# Patient Record
Sex: FEMALE | Race: WHITE | NOT HISPANIC OR LATINO | Employment: OTHER | URBAN - METROPOLITAN AREA
[De-identification: names, ages, dates, MRNs, and addresses within clinical notes are randomized per-mention and may not be internally consistent; named-entity substitution may affect disease eponyms.]

---

## 2023-06-26 PROBLEM — K92.2 ACUTE UPPER GI BLEEDING: Status: ACTIVE | Noted: 2023-06-26

## 2023-06-26 PROBLEM — F10.10 ALCOHOL ABUSE: Status: ACTIVE | Noted: 2023-06-26

## 2023-06-28 DIAGNOSIS — K62.89 RECTAL MASS: Primary | ICD-10-CM

## 2023-06-28 PROBLEM — D49.0: Status: ACTIVE | Noted: 2023-06-28

## 2023-06-28 PROBLEM — K92.2 ACUTE UPPER GI BLEEDING: Status: RESOLVED | Noted: 2023-06-26 | Resolved: 2023-06-28

## 2023-07-11 ENCOUNTER — TELEPHONE (OUTPATIENT)
Dept: SURGERY | Facility: CLINIC | Age: 43
End: 2023-07-11
Payer: MEDICAID

## 2023-07-11 NOTE — TELEPHONE ENCOUNTER
"Spoke with pt regarding a sooner appt with [facility]. Informed pt that there are no sooner appts available but appt has been placed on the wait list. Pt states, "she had a colonoscopy recently and was told she more than likely has stage 1 cancer but no further details or results from pathology." Pt was told by Cristhian Lanza NP that she would be scheduled with a provider. Provider not within [facility], but advised that this may have been a provider within [facility] regarding her upper GI bleed. Number provided to [facility] to contact Dr. Bateman's office regarding more information from colonoscopy. Pt verbalized understanding. Denies further questions.       ----- Message from Sadie Leach sent at 7/11/2023  4:33 PM CDT -----  Regarding: Patient Advice  Contact: Pt [phone]  Pt is calling to speak to staff please call       "

## 2023-07-14 ENCOUNTER — TELEPHONE (OUTPATIENT)
Dept: GASTROENTEROLOGY | Facility: CLINIC | Age: 43
End: 2023-07-14
Payer: MEDICAID

## 2023-07-14 NOTE — TELEPHONE ENCOUNTER
Spoke with patient about test results. Verbal understanding.       ----- Message from Joey Bateman MD sent at 7/7/2023 10:16 AM CDT -----  P path reviewed.  Biopsy of stomach shows minor inflammation.  Rectal lesion shows precancerous given size likely more ominous tissue beneath sampled area.  Still needs to be removed.  Maintain appointment with colorectal surgery on the 21st

## 2023-07-19 ENCOUNTER — TELEPHONE (OUTPATIENT)
Dept: SURGERY | Facility: CLINIC | Age: 43
End: 2023-07-19
Payer: MEDICAID

## 2023-07-20 NOTE — PROGRESS NOTES
[facility] Office Visit History and Physical    Referring Md:   Joey Bateman Md  [address]  [address]  [place] [address]    SUBJECTIVE:     Chief Complaint:     History of Present Illness:  The patient is a new patient to this practice.   Course is as follows:  Shruthi Churchill is a 43 y.o. female presents with rectal polyp.  Has noticed a prolapsing lesion from her rectum requiring ED visits for reduction.  Had a colonoscopy with Dr. Bateman on 6/28 which showed a large rectal polyp concerning for malignancy. Biopsies showed tubular adenoma. Patient also with painful hemorrhoids that she attributes to history of weight lifting/training.  No family history of colon cancer or inflammatory bowel disease.     Last Colonoscopy: 6/28/23  Findings:        Hemorrhoids were found on perianal exam.        The terminal ileum appeared normal.        A diffuse area of mild melanosis was found in the entire colon.        A frond-like/villous non-obstructing large mass was found in the        rectum. The mass was partially circumferential (involving one-third        of the lumen circumference). The mass measured five cm in length. No        bleeding was present. This was biopsied with a cold forceps for        histology. Verification of patient identification for the specimen        was done. Estimated blood loss was minimal.   Impression:            - Preparation of the colon was fair.                          - Hemorrhoids found on perianal exam.                          - The examined portion of the ileum was normal.                          - Melanosis in the colon.                          - Likely malignant tumor in the rectum. Biopsied.     Review of patient's allergies indicates:   Allergen Reactions    Shellfish containing products Swelling     Facial swelling       Past Medical History:   Diagnosis Date    Gilbert's syndrome     Hypertension      Past Surgical History:   Procedure Laterality Date     "COLONOSCOPY N/A 6/28/2023    Procedure: COLONOSCOPY;  Surgeon: Joey Bateman MD;  Location: [facility];  Service: Endoscopy;  Laterality: N/A;    ESOPHAGOGASTRODUODENOSCOPY N/A 6/28/2023    Procedure: EGD (ESOPHAGOGASTRODUODENOSCOPY);  Surgeon: Joey Bateman MD;  Location: [facility];  Service: Endoscopy;  Laterality: N/A;     No family history on file.  Social History     Tobacco Use    Smoking status: Never    Smokeless tobacco: Never   Substance Use Topics    Alcohol use: Yes     Comment: 2 bottles of wine daily    Drug use: Yes     Comment: mushrooms rarely        Review of Systems:  Review of Systems   All other systems reviewed and are negative.    OBJECTIVE:     Vital Signs (Most Recent)  /82 (BP Location: Left arm, Patient Position: Sitting)   Pulse 70   Resp 19   Ht 5' 4" (1.626 m)   Wt 59.2 kg (130 lb 8.2 oz)   LMP 06/18/2023   SpO2 98%   BMI 22.40 kg/m²     Physical Exam:  General: 43 y.o. female , anxious   Neuro: alert and oriented x 4.  Moves all extremities.     HEENT: normocephalic, atraumatic, PERRL, EOMI   Respiratory: respirations are even and unlabored  Cardiac: regular rate and rhythm  Abdomen: soft, NTND  Extremities: Warm dry and intact  Skin: no rashes  Anorectal: + external hemorrhoids with inflammation, no thrombosis    See provation report for flexible sigmoidoscopy    Labs:   : 1.  Stomach, random biopsies:   Gastric mucosa with mild chronic gastritis.   No evidence of Helicobacter species on H&E stain.     2. Rectum, lesion, biopsy:   Tubular adenoma, fragmented.        Imaging:   Impression:     1. Findings suggesting hepatic steatosis, correlation with LFTs recommended.  2. No findings to suggest obstructive uropathy noting there is contrast within the renal collecting systems on the pre contrast images limiting evaluation for nephrolithiasis.  3. Moderate to large amount of stool throughout the colon, could reflect developing constipation.  4. Please " see above for several additional findings.        Electronically signed by: Caden Nieto MD  Date:                                            06/26/2023  Time:                                           12:11      ASSESSMENT/PLAN:     Diagnoses and all orders for this visit:    Tumor of rectum  -     Cancel: MRI Rectal Cancer Without Contrast; Future  -     MRI Rectal Cancer Without Contrast; Future  -     Specimen to Pathology Gastrointestinal tract    Other orders  -     hydrocortisone (ANUSOL-HC) 2.5 % rectal cream; Place rectally 2 (two) times daily.  -     diazePAM (VALIUM) 5 MG tablet; Take 1 tablet (5 mg total) by mouth every 6 (six) hours as needed for Anxiety (take 1 tab 30 min prior to MRI).  -     Flexible Sigmoidoscopy        43 y.o. female with large rectal polyp     - Labs, imaging, and endoscopy reviewed.   - Patient with large polyp in distal rectum.  Initial pathology consistent with TA. Flexible sigmoidoscopy in office with repeat biopsies taken.   - Due to size and location, will plan for MR to assess for underlying invasive component given size and risk of sampling error   - anusol and recticare for hemorrhoids, laxative as needed for constipation   - follow up after MR complete.      Barbara Zaldivar MD  Staff Surgeon  Colon & Rectal Surgery

## 2023-07-21 ENCOUNTER — OFFICE VISIT (OUTPATIENT)
Dept: SURGERY | Facility: CLINIC | Age: 43
End: 2023-07-21
Payer: MEDICAID

## 2023-07-21 ENCOUNTER — TELEPHONE (OUTPATIENT)
Dept: SURGERY | Facility: CLINIC | Age: 43
End: 2023-07-21
Payer: MEDICAID

## 2023-07-21 VITALS
OXYGEN SATURATION: 98 % | SYSTOLIC BLOOD PRESSURE: 125 MMHG | DIASTOLIC BLOOD PRESSURE: 82 MMHG | HEART RATE: 70 BPM | BODY MASS INDEX: 22.28 KG/M2 | HEIGHT: 64 IN | RESPIRATION RATE: 19 BRPM | WEIGHT: 130.5 LBS

## 2023-07-21 DIAGNOSIS — K62.89 RECTAL MASS: ICD-10-CM

## 2023-07-21 DIAGNOSIS — D49.0: Primary | ICD-10-CM

## 2023-07-21 PROCEDURE — 99204 OFFICE O/P NEW MOD 45 MIN: CPT | Mod: S$PBB,,, | Performed by: SURGERY

## 2023-07-21 PROCEDURE — 1159F MED LIST DOCD IN RCRD: CPT | Mod: CPTII,,, | Performed by: SURGERY

## 2023-07-21 PROCEDURE — 99204 PR OFFICE/OUTPT VISIT, NEW, LEVL IV, 45-59 MIN: ICD-10-PCS | Mod: S$PBB,,, | Performed by: SURGERY

## 2023-07-21 PROCEDURE — 3074F SYST BP LT 130 MM HG: CPT | Mod: CPTII,,, | Performed by: SURGERY

## 2023-07-21 PROCEDURE — 88307 TISSUE EXAM BY PATHOLOGIST: CPT | Mod: 26,,, | Performed by: PATHOLOGY

## 2023-07-21 PROCEDURE — 3074F PR MOST RECENT SYSTOLIC BLOOD PRESSURE < 130 MM HG: ICD-10-PCS | Mod: CPTII,,, | Performed by: SURGERY

## 2023-07-21 PROCEDURE — 88307 PR  SURG PATH,LEVEL V: ICD-10-PCS | Mod: 26,,, | Performed by: PATHOLOGY

## 2023-07-21 PROCEDURE — 99999 PR PBB SHADOW E&M-EST. PATIENT-LVL III: ICD-10-PCS | Mod: PBBFAC,,, | Performed by: SURGERY

## 2023-07-21 PROCEDURE — 3008F PR BODY MASS INDEX (BMI) DOCUMENTED: ICD-10-PCS | Mod: CPTII,,, | Performed by: SURGERY

## 2023-07-21 PROCEDURE — 99213 OFFICE O/P EST LOW 20 MIN: CPT | Mod: PBBFAC,25 | Performed by: SURGERY

## 2023-07-21 PROCEDURE — 3079F DIAST BP 80-89 MM HG: CPT | Mod: CPTII,,, | Performed by: SURGERY

## 2023-07-21 PROCEDURE — 3079F PR MOST RECENT DIASTOLIC BLOOD PRESSURE 80-89 MM HG: ICD-10-PCS | Mod: CPTII,,, | Performed by: SURGERY

## 2023-07-21 PROCEDURE — 1159F PR MEDICATION LIST DOCUMENTED IN MEDICAL RECORD: ICD-10-PCS | Mod: CPTII,,, | Performed by: SURGERY

## 2023-07-21 PROCEDURE — 88307 TISSUE EXAM BY PATHOLOGIST: CPT | Performed by: PATHOLOGY

## 2023-07-21 PROCEDURE — 99999 PR PBB SHADOW E&M-EST. PATIENT-LVL III: CPT | Mod: PBBFAC,,, | Performed by: SURGERY

## 2023-07-21 PROCEDURE — 3008F BODY MASS INDEX DOCD: CPT | Mod: CPTII,,, | Performed by: SURGERY

## 2023-07-21 RX ORDER — DIAZEPAM 5 MG/1
5 TABLET ORAL EVERY 6 HOURS PRN
Qty: 2 TABLET | Refills: 0 | Status: SHIPPED | OUTPATIENT
Start: 2023-07-21 | End: 2023-08-15 | Stop reason: SDUPTHER

## 2023-07-21 RX ORDER — HYDROCORTISONE 25 MG/G
CREAM TOPICAL 2 TIMES DAILY
Qty: 28 G | Refills: 3 | Status: ON HOLD | OUTPATIENT
Start: 2023-07-21 | End: 2023-10-11 | Stop reason: HOSPADM

## 2023-07-21 NOTE — TELEPHONE ENCOUNTER
"Spoke with pt regarding appt for 1:40 PM with Dr. Zaldivar/. Pt states, "she is running late but should arrive shortly." Directions provided to clinic location. Requested pt call office for more questions or concerns.       ----- Message from Aliza Gomez sent at 7/21/2023  1:10 PM CDT -----  Regarding: running late  Contact: [phone]  Shruthi Churchill calling regarding running late to appt but should be there within the 15 min brad period. Please call    "

## 2023-07-21 NOTE — PROVATION PATIENT INSTRUCTIONS
Discharge Summary/Instructions after an Endoscopic Procedure  Patient Name: Shruthi Churchill  Patient MRN: [id number]  Patient YOB: 1980  Friday, July 21, 2023  Barbara Zaldivar MD  Dear patient,  As a result of recent federal legislation (The Federal Cures Act), you may   receive lab or pathology results from your procedure in your [facility]   account before your physician is able to contact you. Your physician or   their representative will relay the results to you with their   recommendations at their soonest availability.  Thank you,  RESTRICTIONS:  During your procedure today, you received medications for sedation.  These   medications may affect your judgment, balance and coordination.  Therefore,   for 24 hours, you have the following restrictions:   - DO NOT drive a car, operate machinery, make legal/financial decisions,   sign important papers or drink alcohol.    ACTIVITY:  Today: no heavy lifting, straining or running due to procedural   sedation/anesthesia.  The following day: return to full activity including work.  DIET:  Eat and drink normally unless instructed otherwise.     TREATMENT FOR COMMON SIDE EFFECTS:  - Mild abdominal pain, nausea, belching, bloating or excessive gas:  rest,   eat lightly and use a heating pad.  - Sore Throat: treat with throat lozenges and/or gargle with warm salt   water.  - Because air was used during the procedure, expelling large amounts of air   from your rectum or belching is normal.  - If a bowel prep was taken, you may not have a bowel movement for 1-3 days.    This is normal.  SYMPTOMS TO WATCH FOR AND REPORT TO YOUR PHYSICIAN:  1. Abdominal pain or bloating, other than gas cramps.  2. Chest pain.  3. Back pain.  4. Signs of infection such as: chills or fever occurring within 24 hours   after the procedure.  5. Rectal bleeding, which would show as bright red, maroon, or black stools.   (A tablespoon of blood from the rectum is not serious, especially if    hemorrhoids are present.)  6. Vomiting.  7. Weakness or dizziness.  GO DIRECTLY TO THE NEAREST EMERGENCY ROOM IF YOU HAVE ANY OF THE FOLLOWING:      Difficulty breathing              Chills and/or fever over 101 F   Persistent vomiting and/or vomiting blood   Severe abdominal pain   Severe chest pain   Black, tarry stools   Bleeding- more than one tablespoon   Any other symptom or condition that you feel may need urgent attention  Your doctor recommends these additional instructions:  If any biopsies were taken, your doctors clinic will contact you in 1 to 2   weeks with any results.  - Discharge patient to home (ambulatory).  For questions, problems or results please call your physician - Barbara Zaldivar MD at Work:  [phone].  [facility], EMERGENCY ROOM PHONE NUMBER: [phone]  IF A COMPLICATION OR EMERGENCY SITUATION ARISES AND YOU ARE UNABLE TO REACH   YOUR PHYSICIAN - GO DIRECTLY TO THE EMERGENCY ROOM.  MD Barbara Tarango MD  7/21/2023 3:31:45 PM  This report has been verified and signed electronically.  Dear patient,  As a result of recent federal legislation (The Federal Cures Act), you may   receive lab or pathology results from your procedure in your [facility]   account before your physician is able to contact you. Your physician or   their representative will relay the results to you with their   recommendations at their soonest availability.  Thank you,  PROVATION

## 2023-07-28 LAB
COMMENT: NORMAL
FINAL PATHOLOGIC DIAGNOSIS: NORMAL
GROSS: NORMAL
Lab: NORMAL

## 2023-08-15 ENCOUNTER — TELEPHONE (OUTPATIENT)
Dept: SURGERY | Facility: CLINIC | Age: 43
End: 2023-08-15
Payer: MEDICAID

## 2023-08-15 ENCOUNTER — HOSPITAL ENCOUNTER (OUTPATIENT)
Dept: RADIOLOGY | Facility: HOSPITAL | Age: 43
Discharge: HOME | End: 2023-08-15
Attending: SURGERY
Payer: MEDICAID

## 2023-08-15 DIAGNOSIS — D49.0: ICD-10-CM

## 2023-08-15 PROCEDURE — 72195 MRI RECTAL CANCER WITHOUT CONTRAST: ICD-10-PCS | Mod: 26,,, | Performed by: STUDENT IN AN ORGANIZED HEALTH CARE EDUCATION/TRAINING PROGRAM

## 2023-08-15 PROCEDURE — 72195 MRI PELVIS W/O DYE: CPT | Mod: 26,,, | Performed by: STUDENT IN AN ORGANIZED HEALTH CARE EDUCATION/TRAINING PROGRAM

## 2023-08-15 PROCEDURE — 72195 MRI PELVIS W/O DYE: CPT | Mod: TC

## 2023-08-15 RX ORDER — DIAZEPAM 5 MG/1
5 TABLET ORAL EVERY 6 HOURS PRN
Qty: 2 TABLET | Refills: 0 | Status: SHIPPED | OUTPATIENT
Start: 2023-08-15 | End: 2023-08-23 | Stop reason: CLARIF

## 2023-08-15 NOTE — TELEPHONE ENCOUNTER
"Spoke with pharmacy in regards to if the pt picked up prescribed Valium. Pharmacy confirms it was picked up on 7/21. Spoke with pt to discuss medication request. Pt states, "she did not  and she does not have it with her medications, only something for her stomach. Sometimes her neighbor picks up and will need to discuss with them if they have it." Requesting something to take for MRI tonight at 7 PM for anxiety. Informed pt that request will be sent to be filled but not not be completed in time for imaging tonight at 7 PM. Pt requesting to have sent to [facility], [address] [phone]. Informed that script may need to be sent to [facility] to be filled faster. Pt denies further questions.       ----- Message from Eliel Bennett sent at 8/15/2023  1:07 PM CDT -----  Regarding: MRI  Contact: pt.[phone]  Pt is calling asking for something to relax her for the scheduled MRI 8/15 @7 pm. She is asking for Valum.Patient Requesting Call Back @ [phone]      [id number] - [place], [place] - [address]  [address]  [place] [address]  Phone: [phone] Fax: [phone]        "

## 2023-08-15 NOTE — TELEPHONE ENCOUNTER
Spoke with pt regarding Valium for MRI tonight. Informed that 2 5 mg of Valium was sent to [facility] pharmacy. Explained that once she picks up, she will not be able to get more if lost or misplaced. Pt verbalized understanding. Denies questions.       ----- Message from Marilee Cobb NP sent at 8/15/2023  2:50 PM CDT -----  Regarding: RE: Medication request  Looks like anna just sent more. Thanks!  ----- Message -----  From: Susi Jean RN  Sent: 8/15/2023   2:15 PM CDT  To: Anna Matthew NP; Marilee Cobb NP; #  Subject: RE: Medication request                           Hey,   I know. I explained that to her and that we may not be able to send in more. I can contact her and let her know if not sending more.      ----- Message -----  From: Marilee Cobb NP  Sent: 8/15/2023   2:11 PM CDT  To: Anna Matthew NP; #  Subject: RE: Medication request                           Kayley ordered her 2 for this MRI on 7/21. Thanks.   ----- Message -----  From: Susi Jean RN  Sent: 8/15/2023   1:49 PM CDT  To: Anna Matthew NP; Marilee Cobb NP; #  Subject: Medication request                               Please read last call note with pt.   She is requesting Valium for MRI tonight at 7 PM.     Susi Mcwilliams

## 2023-08-16 ENCOUNTER — TELEPHONE (OUTPATIENT)
Dept: SURGERY | Facility: CLINIC | Age: 43
End: 2023-08-16
Payer: MEDICAID

## 2023-08-16 ENCOUNTER — PATIENT MESSAGE (OUTPATIENT)
Dept: SURGERY | Facility: CLINIC | Age: 43
End: 2023-08-16
Payer: MEDICAID

## 2023-08-16 DIAGNOSIS — D49.0: Primary | ICD-10-CM

## 2023-08-16 RX ORDER — METRONIDAZOLE 500 MG/1
TABLET ORAL
Qty: 2 TABLET | Refills: 0 | Status: ON HOLD | OUTPATIENT
Start: 2023-08-16 | End: 2023-08-29 | Stop reason: HOSPADM

## 2023-08-16 RX ORDER — CIPROFLOXACIN 500 MG/1
TABLET ORAL
Qty: 2 TABLET | Refills: 0 | Status: ON HOLD | OUTPATIENT
Start: 2023-08-16 | End: 2023-08-29 | Stop reason: HOSPADM

## 2023-08-16 NOTE — TELEPHONE ENCOUNTER
Called Ms. Churchill to discuss biopsy and MRI results.  Will proceed with transanal resection.  Plan for 8/29.  Full bowel prep. Will confirm date via portal.  Pre-op appt on 8/28

## 2023-08-17 ENCOUNTER — TELEPHONE (OUTPATIENT)
Dept: SURGERY | Facility: CLINIC | Age: 43
End: 2023-08-17
Payer: MEDICAID

## 2023-08-17 ENCOUNTER — PATIENT MESSAGE (OUTPATIENT)
Dept: SURGERY | Facility: CLINIC | Age: 43
End: 2023-08-17
Payer: MEDICAID

## 2023-08-17 NOTE — TELEPHONE ENCOUNTER
Attempted to contact pt regarding Pre-op instructions and appt on 8/28. No answer. Left voicemail requesting call back. [facility] number provided.       ----- Message from Jacqui Yadav RN sent at 8/16/2023  3:05 PM CDT -----  Thank you Dr. Zaldivar!  I will make her pre-op appt now.  ----- Message -----  From: Barbara Zaldivar MD  Sent: 8/16/2023   1:47 PM CDT  To: [facility] Jim[facility] Staff    Ms. Churchill is going to be a robotic transanal resection on 8/29 at [facility].  Will need full bowel prep.  Antibiotics sent.  Can we make her a pre-op appt on 8/28?     Susi - can you call her tomorrow and go over pre-op details?

## 2023-08-21 RX ORDER — MUPIROCIN 20 MG/G
OINTMENT TOPICAL
Status: CANCELLED | OUTPATIENT
Start: 2023-08-21

## 2023-08-23 ENCOUNTER — HOSPITAL ENCOUNTER (OUTPATIENT)
Dept: PREADMISSION TESTING | Facility: OTHER | Age: 43
Discharge: HOME | End: 2023-08-23
Attending: SURGERY
Payer: MEDICAID

## 2023-08-23 ENCOUNTER — ANESTHESIA EVENT (OUTPATIENT)
Dept: SURGERY | Facility: OTHER | Age: 43
End: 2023-08-23
Payer: MEDICAID

## 2023-08-23 VITALS
OXYGEN SATURATION: 96 % | WEIGHT: 130 LBS | SYSTOLIC BLOOD PRESSURE: 118 MMHG | TEMPERATURE: 98 F | HEIGHT: 64 IN | BODY MASS INDEX: 22.2 KG/M2 | HEART RATE: 78 BPM | DIASTOLIC BLOOD PRESSURE: 83 MMHG

## 2023-08-23 DIAGNOSIS — Z01.818 PREOP TESTING: Primary | ICD-10-CM

## 2023-08-23 LAB
ABO + RH BLD: NORMAL
ALBUMIN SERPL BCP-MCNC: 4.6 G/DL (ref 3.5–5.2)
ALP SERPL-CCNC: 59 U/L (ref 55–135)
ALT SERPL W/O P-5'-P-CCNC: 18 U/L (ref 10–44)
ANION GAP SERPL CALC-SCNC: 10 MMOL/L (ref 8–16)
ANION GAP SERPL CALC-SCNC: 10 MMOL/L (ref 8–16)
AST SERPL-CCNC: 32 U/L (ref 10–40)
BASOPHILS # BLD AUTO: 0.03 K/UL (ref 0–0.2)
BASOPHILS NFR BLD: 1.2 % (ref 0–1.9)
BILIRUB SERPL-MCNC: 1.5 MG/DL (ref 0.1–1)
BLD GP AB SCN CELLS X3 SERPL QL: NORMAL
BUN SERPL-MCNC: 13 MG/DL (ref 6–20)
BUN SERPL-MCNC: 13 MG/DL (ref 6–20)
CALCIUM SERPL-MCNC: 9.9 MG/DL (ref 8.7–10.5)
CALCIUM SERPL-MCNC: 9.9 MG/DL (ref 8.7–10.5)
CHLORIDE SERPL-SCNC: 103 MMOL/L (ref 95–110)
CHLORIDE SERPL-SCNC: 103 MMOL/L (ref 95–110)
CO2 SERPL-SCNC: 26 MMOL/L (ref 23–29)
CO2 SERPL-SCNC: 26 MMOL/L (ref 23–29)
CREAT SERPL-MCNC: 0.8 MG/DL (ref 0.5–1.4)
CREAT SERPL-MCNC: 0.8 MG/DL (ref 0.5–1.4)
DIFFERENTIAL METHOD: ABNORMAL
EOSINOPHIL # BLD AUTO: 0 K/UL (ref 0–0.5)
EOSINOPHIL NFR BLD: 1.2 % (ref 0–8)
ERYTHROCYTE [DISTWIDTH] IN BLOOD BY AUTOMATED COUNT: 14.6 % (ref 11.5–14.5)
EST. GFR  (NO RACE VARIABLE): >60 ML/MIN/1.73 M^2
EST. GFR  (NO RACE VARIABLE): >60 ML/MIN/1.73 M^2
GLUCOSE SERPL-MCNC: 84 MG/DL (ref 70–110)
GLUCOSE SERPL-MCNC: 84 MG/DL (ref 70–110)
HCT VFR BLD AUTO: 39.2 % (ref 37–48.5)
HGB BLD-MCNC: 12.2 G/DL (ref 12–16)
IMM GRANULOCYTES # BLD AUTO: 0 K/UL (ref 0–0.04)
IMM GRANULOCYTES NFR BLD AUTO: 0 % (ref 0–0.5)
LYMPHOCYTES # BLD AUTO: 0.9 K/UL (ref 1–4.8)
LYMPHOCYTES NFR BLD: 35.2 % (ref 18–48)
MCH RBC QN AUTO: 27.9 PG (ref 27–31)
MCHC RBC AUTO-ENTMCNC: 31.1 G/DL (ref 32–36)
MCV RBC AUTO: 90 FL (ref 82–98)
MONOCYTES # BLD AUTO: 0.3 K/UL (ref 0.3–1)
MONOCYTES NFR BLD: 13.1 % (ref 4–15)
NEUTROPHILS # BLD AUTO: 1.2 K/UL (ref 1.8–7.7)
NEUTROPHILS NFR BLD: 49.3 % (ref 38–73)
NRBC BLD-RTO: 0 /100 WBC
PLATELET # BLD AUTO: 280 K/UL (ref 150–450)
PMV BLD AUTO: 9.2 FL (ref 9.2–12.9)
POTASSIUM SERPL-SCNC: 4.3 MMOL/L (ref 3.5–5.1)
POTASSIUM SERPL-SCNC: 4.3 MMOL/L (ref 3.5–5.1)
PROT SERPL-MCNC: 7.1 G/DL (ref 6–8.4)
RBC # BLD AUTO: 4.38 M/UL (ref 4–5.4)
SODIUM SERPL-SCNC: 139 MMOL/L (ref 136–145)
SODIUM SERPL-SCNC: 139 MMOL/L (ref 136–145)
SPECIMEN OUTDATE: NORMAL
WBC # BLD AUTO: 2.44 K/UL (ref 3.9–12.7)

## 2023-08-23 PROCEDURE — 93010 EKG 12-LEAD: ICD-10-PCS | Mod: ,,, | Performed by: INTERNAL MEDICINE

## 2023-08-23 PROCEDURE — 80053 COMPREHEN METABOLIC PANEL: CPT | Performed by: ANESTHESIOLOGY

## 2023-08-23 PROCEDURE — 36415 COLL VENOUS BLD VENIPUNCTURE: CPT | Performed by: ANESTHESIOLOGY

## 2023-08-23 PROCEDURE — 86900 BLOOD TYPING SEROLOGIC ABO: CPT | Performed by: ANESTHESIOLOGY

## 2023-08-23 PROCEDURE — 85025 COMPLETE CBC W/AUTO DIFF WBC: CPT | Performed by: ANESTHESIOLOGY

## 2023-08-23 PROCEDURE — 93005 ELECTROCARDIOGRAM TRACING: CPT

## 2023-08-23 PROCEDURE — 93010 ELECTROCARDIOGRAM REPORT: CPT | Mod: ,,, | Performed by: INTERNAL MEDICINE

## 2023-08-23 RX ORDER — LIDOCAINE HYDROCHLORIDE 10 MG/ML
0.5 INJECTION, SOLUTION EPIDURAL; INFILTRATION; INTRACAUDAL; PERINEURAL ONCE
Status: CANCELLED | OUTPATIENT
Start: 2023-08-23 | End: 2023-08-23

## 2023-08-23 RX ORDER — SODIUM CHLORIDE, SODIUM LACTATE, POTASSIUM CHLORIDE, CALCIUM CHLORIDE 600; 310; 30; 20 MG/100ML; MG/100ML; MG/100ML; MG/100ML
INJECTION, SOLUTION INTRAVENOUS CONTINUOUS
Status: CANCELLED | OUTPATIENT
Start: 2023-08-23

## 2023-08-23 RX ORDER — ACETAMINOPHEN 500 MG
1000 TABLET ORAL
Status: CANCELLED | OUTPATIENT
Start: 2023-08-23 | End: 2023-08-23

## 2023-08-23 NOTE — ANESTHESIA PREPROCEDURE EVALUATION
08/23/2023  Shruthi Churchill is a 43 y.o., female.      Pre-op Assessment    I have reviewed the Patient Summary Reports.     I have reviewed the Nursing Notes. I have reviewed the NPO Status.   I have reviewed the Medications.     Review of Systems  Anesthesia Hx:  Denies Family Hx of Anesthesia complications.   Denies Personal Hx of Anesthesia complications.   Social:  Non-Smoker    Hematology/Oncology:     Oncology Normal    -- Anemia: Hematology Comments: Last Hb 8.6    Cardiovascular:   Hypertension    Pulmonary:   Shortness of breath Pt reports new onset of SOB not associated with exertion.  Will refer to primary care.   Renal/:  Renal/ Normal     Hepatic/GI:   Liver Disease, (Gilbert's)    Musculoskeletal:  Musculoskeletal Normal    Neurological:  Neurology Normal    Endocrine:  Endocrine Normal    Psych:   anxiety          Physical Exam  General: Well nourished, Cooperative, Alert and Oriented    Airway:  Mallampati: II   Mouth Opening: Normal  TM Distance: Normal  Tongue: Normal  Neck ROM: Normal ROM    Dental:  Intact, Veneers        Anesthesia Plan  Type of Anesthesia, risks & benefits discussed:    Anesthesia Type: Gen ETT  Intra-op Monitoring Plan: Standard ASA Monitors  Post Op Pain Control Plan: multimodal analgesia  Induction:  IV  Airway Plan: Video, Post-Induction  Informed Consent: Informed consent signed with the Patient and all parties understand the risks and agree with anesthesia plan.  All questions answered.   ASA Score: 3  Anesthesia Plan Notes: CBC, BMP, T&S, EKG  Primary care referral for SOB    Ready For Surgery From Anesthesia Perspective.     .

## 2023-08-23 NOTE — DISCHARGE INSTRUCTIONS
Information to Prepare you for your Surgery    PRE-ADMIT TESTING -  [phone]    [address]          Your surgery has been scheduled at [facility]. We are pleased to have the opportunity to serve you. For Further Information please call [phone].    On the day of surgery please report to the Information Desk on the 1st floor.    CONTACT YOUR PHYSICIAN'S OFFICE THE DAY PRIOR TO YOUR SURGERY TO OBTAIN YOUR ARRIVAL TIME.     The evening before surgery do not eat anything after 9 p.m. ( this includes hard candy, chewing gum and mints).  You may only have GATORADE, POWERADE AND WATER  from 9 p.m. until you leave your home.   DO NOT DRINK ANY LIQUIDS ON THE WAY TO THE HOSPITAL.      Why does your anesthesiologist allow you to drink Gatorade/Powerade before surgery?  Gatorade/Powerade helps to increase your comfort before surgery and to decrease your nausea after surgery. The carbohydrates in Gatorade/Powerade help reduce your body's stress response to surgery.  If you are a diabetic-drink only water prior to surgery.       Patients may have 2 visitors pre and post procedure. Only 2 visitors will be allowed in the Surgical building with the patient. No one under the age of 12 will be allowed into the facility.    SPECIAL MEDICATION INSTRUCTIONS: TAKE medications checked off by the Anesthesiologist on your Medication List.    Angiogram Patients: Take medications as instructed by your physician, including aspirin.     Surgery Patients:    If you take ASPIRIN - Your PHYSICIAN/SURGEON will need to inform you IF/OR when you need to stop taking aspirin prior to your surgery.     The week prior to surgery do not ot take any medications containing IBUPROFEN or NSAIDS ( Advil, Motrin, Goodys, BC, Aleve, Naproxen etc) If you are not sure if you should take a medicine please call your surgeon's office.  Ok to take Tylenol    Do Not Wear any make-up  (especially eye make-up) to surgery. Please remove any false eyelashes or eyelash extensions. If you arrive the day of surgery with makeup/eyelashes on you will be required to remove prior to surgery. (There is a risk of corneal abrasions if eye makeup/eyelash extensions are not removed)      Leave all valuables at home.   Do Not wear any jewelry or watches, including any metal in body piercings. Jewelry must be removed prior to coming to the hospital.  There is a possibility that rings that are unable to be removed may be cut off if they are on the surgical extremity.    Please remove all hair extensions, wigs, clips and any other metal accessories/ ornaments from your hair.  These items may pose a flammable/fire risk in Surgery and must be removed.    Do not shave your surgical area at least 5 days prior to your surgery. The surgical prep will be performed at the hospital according to Infection Control regulations.    Contact Lens must be removed before surgery. Either do not wear the contact lens or bring a case and solution for storage.  Please bring a container for eyeglasses or dentures as required.  Bring any paperwork your physician has provided, such as consent forms,  history and physicals, doctor's orders, etc.   Bring comfortable clothes that are loose fitting to wear upon discharge. Take into consideration the type of surgery being performed.  Maintain your diet as advised per your physician the day prior to surgery.      Adequate rest the night before surgery is advised.   Park in the Parking lot behind the hospital or in the [facility] across the street from the parking lot. Parking is complimentary.  If you will be discharged the same day as your procedure, please arrange for a responsible adult to drive you home or to accompany you if traveling by taxi.   YOU WILL NOT BE PERMITTED TO DRIVE OR TO LEAVE THE HOSPITAL ALONE AFTER SURGERY.   If you are being discharged the same day, it is  strongly recommended that you arrange for someone to remain with you for the first 24 hrs following your surgery.    The Surgeon will speak to your family/visitor after your surgery regarding the outcome of your surgery and post op care.  The Surgeon may speak to you after your surgery, but there is a possibility you may not remember the details.  Please check with your family members regarding the conversation with the Surgeon.    We strongly recommend whoever is bringing you home be present for discharge instructions.  This will ensure a thorough understanding for your post op home care.    ALL CHILDREN MUST ALWAYS BE ACCOMPANIED BY AN ADULT.    Visitors-Refer to current Visitor policy handouts.    Thank you for your cooperation.  The Staff of [facility].            Bathing Instructions with Hibiclens    Shower the evening before and morning of your procedure with Chlorhexidine (Hibiclens)  do not use Chlorhexidine on your face or genitals. Do not get in your eyes.  Wash your face with water and your regular face wash/soap  Use your regular shampoo  Apply Chlorhexidine (Hibiclens) directly on your skin or on a wet washcloth and wash gently. When showering: Move away from the shower stream when applying Chlorhexidine (Hibiclens) to avoid rinsing off too soon.  Rinse thoroughly with warm water  Do not dilute Chlorhexidine (Hibiclens)   Dry off as usual, do not use any deodorant, powder, body lotions, perfume, after shave or cologne.

## 2023-08-23 NOTE — PRE ADMISSION SCREENING
Per Dr. Wade, patient needs to see Primary Care to evaluate shortness of breath prior to surgery.  Patient without a PCP.  Will walk in to see community provider Dr. Avery Garcia tomorrow.  Lab and EKG, Anesthesia and Surgeon notes faxed to Dr. Garcia.  Patient given Dr. Garcia contact information and instructed to go to walk in clinic tomorrow 8/24.

## 2023-08-24 ENCOUNTER — TELEPHONE (OUTPATIENT)
Dept: SURGERY | Facility: CLINIC | Age: 43
End: 2023-08-24
Payer: MEDICAID

## 2023-08-24 NOTE — TELEPHONE ENCOUNTER
"Spoke with patient regarding clearance for surgery scheduled on 8/29 with Dr. Zaldivar. Pt was noted to have SOB and Pre-Admit appt and was informed that she would require clearance from her PCP. Pt has Medicaid and does not want to see PCP listed on her card. She was also told that they will only accept walk-ins but are booked for Friday. Advised to contact insurance to see if they can locate any PCPs that could see her before surgery on 8/29 and have them fax the medical clearance to the office. CRS fax number sent via portal to patient. Informed that she may have to r/s surgery until she is able to provide clearance. Pt states, "she will get it done because she is in a lot of pain and cannot afford to wait longer to have surgery." Pt denies further questions or concerns.       ----- Message from Jose Brito sent at 8/24/2023  2:08 PM CDT -----  Regarding: Evaluation appt before surgery  Contact: [phone]  Pt calling to discuss appt that pt was supposed to have for shortness of breath before surgery on 8/29. Pt states she just went to appt and they stated they do not treat that. Please call pt to discuss this evaluation appt.     "

## 2023-08-25 ENCOUNTER — TELEPHONE (OUTPATIENT)
Dept: SURGERY | Facility: CLINIC | Age: 43
End: 2023-08-25
Payer: MEDICAID

## 2023-08-28 ENCOUNTER — OFFICE VISIT (OUTPATIENT)
Dept: SURGERY | Facility: CLINIC | Age: 43
End: 2023-08-28
Payer: MEDICAID

## 2023-08-28 ENCOUNTER — TELEPHONE (OUTPATIENT)
Dept: SURGERY | Facility: CLINIC | Age: 43
End: 2023-08-28
Payer: MEDICAID

## 2023-08-28 VITALS
WEIGHT: 129.31 LBS | DIASTOLIC BLOOD PRESSURE: 76 MMHG | BODY MASS INDEX: 22.07 KG/M2 | HEART RATE: 76 BPM | OXYGEN SATURATION: 98 % | RESPIRATION RATE: 19 BRPM | SYSTOLIC BLOOD PRESSURE: 123 MMHG | HEIGHT: 64 IN

## 2023-08-28 DIAGNOSIS — D49.0: Primary | ICD-10-CM

## 2023-08-28 PROCEDURE — 3078F DIAST BP <80 MM HG: CPT | Mod: CPTII,,, | Performed by: SURGERY

## 2023-08-28 PROCEDURE — 99213 OFFICE O/P EST LOW 20 MIN: CPT | Mod: S$PBB,,, | Performed by: SURGERY

## 2023-08-28 PROCEDURE — 3078F PR MOST RECENT DIASTOLIC BLOOD PRESSURE < 80 MM HG: ICD-10-PCS | Mod: CPTII,,, | Performed by: SURGERY

## 2023-08-28 PROCEDURE — 3008F BODY MASS INDEX DOCD: CPT | Mod: CPTII,,, | Performed by: SURGERY

## 2023-08-28 PROCEDURE — 1159F PR MEDICATION LIST DOCUMENTED IN MEDICAL RECORD: ICD-10-PCS | Mod: CPTII,,, | Performed by: SURGERY

## 2023-08-28 PROCEDURE — 99999 PR PBB SHADOW E&M-EST. PATIENT-LVL III: CPT | Mod: PBBFAC,,, | Performed by: SURGERY

## 2023-08-28 PROCEDURE — 3008F PR BODY MASS INDEX (BMI) DOCUMENTED: ICD-10-PCS | Mod: CPTII,,, | Performed by: SURGERY

## 2023-08-28 PROCEDURE — 3074F SYST BP LT 130 MM HG: CPT | Mod: CPTII,,, | Performed by: SURGERY

## 2023-08-28 PROCEDURE — 3074F PR MOST RECENT SYSTOLIC BLOOD PRESSURE < 130 MM HG: ICD-10-PCS | Mod: CPTII,,, | Performed by: SURGERY

## 2023-08-28 PROCEDURE — 99213 PR OFFICE/OUTPT VISIT, EST, LEVL III, 20-29 MIN: ICD-10-PCS | Mod: S$PBB,,, | Performed by: SURGERY

## 2023-08-28 PROCEDURE — 1159F MED LIST DOCD IN RCRD: CPT | Mod: CPTII,,, | Performed by: SURGERY

## 2023-08-28 PROCEDURE — 99213 OFFICE O/P EST LOW 20 MIN: CPT | Mod: PBBFAC | Performed by: SURGERY

## 2023-08-28 PROCEDURE — 99999 PR PBB SHADOW E&M-EST. PATIENT-LVL III: ICD-10-PCS | Mod: PBBFAC,,, | Performed by: SURGERY

## 2023-08-28 NOTE — PRE ADMISSION SCREENING
Dr. Landa reviewed surgery clearance for evaluation of SOB. Per Dr. Landa -need clarification of cardiology referral. Sent message to RADHA Jimenez for clarifiaction.

## 2023-08-28 NOTE — H&P (VIEW-ONLY)
Colon & Rectal Surgery Clinic Follow Up    HPI:   Shruthi Churchill is a 43 y.o. female who presents for follow up of distal rectal polyp.  MR without evidence of locally advanced disease.  Repeat biopsies negative.  Reports ongoing pain from hemorrhoids.  Prolapse of lesion.       Objective:   Vitals:    08/28/23 1527   BP: 123/76   Pulse: 76   Resp: 19        Physical Exam   Gen: well developed female, NAD  HEENT: normocephalic, atraumatic, PERRL, EOMI   CV: RRR, no murmurs  Resp: nonlabored, CTAB   Abd: soft, NTND   MSK: no gross deformities, no cyanosis or edema       Labs:   Component Ref Range & Units 5 d ago  (8/23/23) 2 mo ago  (6/28/23) 2 mo ago  (6/28/23) 2 mo ago  (6/28/23) 2 mo ago  (6/28/23) 2 mo ago  (6/28/23) 2 mo ago  (6/28/23) 2 mo ago  (6/28/23)   WBC 3.90 - 12.70 K/uL 2.44 Low       3.33 Low       RBC 4.00 - 5.40 M/uL 4.38      2.56 Low       Hemoglobin 12.0 - 16.0 g/dL 12.2   8.6 Low    9.1 Low   8.5 Low    8.9 Low     Hematocrit 37.0 - 48.5 % 39.2  26.8 Low    28.4 Low    25.9 Low   28.0 Low      MCV 82 - 98 fL 90      101 High       MCH 27.0 - 31.0 pg 27.9      33.2 High       MCHC 32.0 - 36.0 g/dL 31.1 Low       32.8      RDW 11.5 - 14.5 % 14.6 High       12.0      Platelets 150 - 450 K/uL 280      217        MR rectum:   Impression:     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  Lesion appears confined to the bowel wall without definite evidence of invasion.     *MR STAGE     T: T1/T2 (tumor confined to rectal wall)     N: N0 (no visible lymph nodes)     *MRF: N/a     Sphincter Involvement: No     Suspicious Extramesorectal Lymph Nodes: No     EMVI: No     Electronically signed by resident: Octavio Reardon  Date:                                            08/16/2023  Time:                                           10:54     Electronically signed by: Murali Petres  Date:                                            08/16/2023  Time:                                            12:19    Assessment and Plan:   Shruthi Churchill  is a 43 y.o. female who presents for follow up of rectal TVA     - patient with pre-operative clearance from PCP   - we reviewed pathology and imaging, discussed risk of underlying malignancy   - we discussed post-operative expectations after surgery.  Will plan for partial thickness excision of rectal polyp.  We discussed risk of post-operative bleeding, tenesmus (rectal spasm), need for further treatment.  Consent signed.   - to OR tomorrow for robotic assistant BOWEN Zaldivar MD  Staff Surgeon   Colon & Rectal Surgery

## 2023-08-28 NOTE — TELEPHONE ENCOUNTER
Spoke with Iza at [facility] in regards to contacting Cristhian Lanza NP clinic notes from 8/25/23 visit. Notes indicate that there is no concern for SOB and anesthesia clearance, but a cardiology referral is recommended. Requested that provider return call to Winifred Crowe- Pre-Admit at [phone] or [phone] for clarification. Iza confirmed contact numbers and sent message to provider.

## 2023-08-28 NOTE — PROGRESS NOTES
Colon & Rectal Surgery Clinic Follow Up    HPI:   Shruthi Churchill is a 43 y.o. female who presents for follow up of distal rectal polyp.  MR without evidence of locally advanced disease.  Repeat biopsies negative.  Reports ongoing pain from hemorrhoids.  Prolapse of lesion.       Objective:   Vitals:    08/28/23 1527   BP: 123/76   Pulse: 76   Resp: 19        Physical Exam   Gen: well developed female, NAD  HEENT: normocephalic, atraumatic, PERRL, EOMI   CV: RRR, no murmurs  Resp: nonlabored, CTAB   Abd: soft, NTND   MSK: no gross deformities, no cyanosis or edema       Labs:   Component Ref Range & Units 5 d ago  (8/23/23) 2 mo ago  (6/28/23) 2 mo ago  (6/28/23) 2 mo ago  (6/28/23) 2 mo ago  (6/28/23) 2 mo ago  (6/28/23) 2 mo ago  (6/28/23) 2 mo ago  (6/28/23)   WBC 3.90 - 12.70 K/uL 2.44 Low       3.33 Low       RBC 4.00 - 5.40 M/uL 4.38      2.56 Low       Hemoglobin 12.0 - 16.0 g/dL 12.2   8.6 Low    9.1 Low   8.5 Low    8.9 Low     Hematocrit 37.0 - 48.5 % 39.2  26.8 Low    28.4 Low    25.9 Low   28.0 Low      MCV 82 - 98 fL 90      101 High       MCH 27.0 - 31.0 pg 27.9      33.2 High       MCHC 32.0 - 36.0 g/dL 31.1 Low       32.8      RDW 11.5 - 14.5 % 14.6 High       12.0      Platelets 150 - 450 K/uL 280      217        MR rectum:   Impression:     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  Lesion appears confined to the bowel wall without definite evidence of invasion.     *MR STAGE     T: T1/T2 (tumor confined to rectal wall)     N: N0 (no visible lymph nodes)     *MRF: N/a     Sphincter Involvement: No     Suspicious Extramesorectal Lymph Nodes: No     EMVI: No     Electronically signed by resident: Octavio Reardon  Date:                                            08/16/2023  Time:                                           10:54     Electronically signed by: Murali Peters  Date:                                            08/16/2023  Time:                                            12:19    Assessment and Plan:   Shruthi Churchill  is a 43 y.o. female who presents for follow up of rectal TVA     - patient with pre-operative clearance from PCP   - we reviewed pathology and imaging, discussed risk of underlying malignancy   - we discussed post-operative expectations after surgery.  Will plan for partial thickness excision of rectal polyp.  We discussed risk of post-operative bleeding, tenesmus (rectal spasm), need for further treatment.  Consent signed.   - to OR tomorrow for robotic assistant BOWEN Zaldivar MD  Staff Surgeon   Colon & Rectal Surgery

## 2023-08-28 NOTE — PRE ADMISSION SCREENING
Spoke with RADHA Ibarra regarding cardiology referral. Per RADHA Ibarra, the referral was a request from the patient and that he did not find any evidence to support the medical necessity of a cardiology referral . Addendum sent and scanned to media.

## 2023-08-28 NOTE — TELEPHONE ENCOUNTER
"Spoke with pt regarding appt today. Pt states, "they are about to park but [place]." Confirmed coming to clinic.   "

## 2023-08-29 ENCOUNTER — ANESTHESIA (OUTPATIENT)
Dept: SURGERY | Facility: OTHER | Age: 43
End: 2023-08-29
Payer: MEDICAID

## 2023-08-29 ENCOUNTER — HOSPITAL ENCOUNTER (OUTPATIENT)
Facility: OTHER | Age: 43
Discharge: HOME | End: 2023-08-29
Attending: SURGERY | Admitting: SURGERY
Payer: MEDICAID

## 2023-08-29 DIAGNOSIS — D49.0: Primary | ICD-10-CM

## 2023-08-29 DIAGNOSIS — K62.9 RECTAL LESION: ICD-10-CM

## 2023-08-29 LAB
B-HCG UR QL: NEGATIVE
CTP QC/QA: YES

## 2023-08-29 PROCEDURE — 0184T EXC RECTAL TUMOR ENDOSCOPIC: CPT | Mod: ,,, | Performed by: SURGERY

## 2023-08-29 PROCEDURE — D9220A PRA ANESTHESIA: Mod: ANES,,, | Performed by: ANESTHESIOLOGY

## 2023-08-29 PROCEDURE — 71000033 HC RECOVERY, INTIAL HOUR: Performed by: SURGERY

## 2023-08-29 PROCEDURE — 37000008 HC ANESTHESIA 1ST 15 MINUTES: Performed by: SURGERY

## 2023-08-29 PROCEDURE — 0184T PR RECTAL TUMOR EXCISION, TRANSANAL ENDOSCOPIC MICROSURGICAL, FULL THICK: ICD-10-PCS | Mod: ,,, | Performed by: SURGERY

## 2023-08-29 PROCEDURE — 71000016 HC POSTOP RECOV ADDL HR: Performed by: SURGERY

## 2023-08-29 PROCEDURE — 63600175 PHARM REV CODE 636 W HCPCS: Performed by: NURSE PRACTITIONER

## 2023-08-29 PROCEDURE — 36000711: Performed by: SURGERY

## 2023-08-29 PROCEDURE — 63600175 PHARM REV CODE 636 W HCPCS

## 2023-08-29 PROCEDURE — 88309 PR  SURG PATH,LEVEL VI: ICD-10-PCS | Mod: 26,,, | Performed by: PATHOLOGY

## 2023-08-29 PROCEDURE — D9220A PRA ANESTHESIA: ICD-10-PCS | Mod: ANES,,, | Performed by: ANESTHESIOLOGY

## 2023-08-29 PROCEDURE — P9045 ALBUMIN (HUMAN), 5%, 250 ML: HCPCS | Mod: JZ,JG | Performed by: STUDENT IN AN ORGANIZED HEALTH CARE EDUCATION/TRAINING PROGRAM

## 2023-08-29 PROCEDURE — D9220A PRA ANESTHESIA: Mod: CRNA,,, | Performed by: STUDENT IN AN ORGANIZED HEALTH CARE EDUCATION/TRAINING PROGRAM

## 2023-08-29 PROCEDURE — 25000003 PHARM REV CODE 250: Performed by: ANESTHESIOLOGY

## 2023-08-29 PROCEDURE — 81025 URINE PREGNANCY TEST: CPT | Performed by: ANESTHESIOLOGY

## 2023-08-29 PROCEDURE — 63600175 PHARM REV CODE 636 W HCPCS: Performed by: STUDENT IN AN ORGANIZED HEALTH CARE EDUCATION/TRAINING PROGRAM

## 2023-08-29 PROCEDURE — 63600175 PHARM REV CODE 636 W HCPCS: Performed by: ANESTHESIOLOGY

## 2023-08-29 PROCEDURE — 63600175 PHARM REV CODE 636 W HCPCS: Performed by: SURGERY

## 2023-08-29 PROCEDURE — 71000039 HC RECOVERY, EACH ADD'L HOUR: Performed by: SURGERY

## 2023-08-29 PROCEDURE — 25000003 PHARM REV CODE 250

## 2023-08-29 PROCEDURE — 88309 TISSUE EXAM BY PATHOLOGIST: CPT | Performed by: PATHOLOGY

## 2023-08-29 PROCEDURE — 25000003 PHARM REV CODE 250: Performed by: STUDENT IN AN ORGANIZED HEALTH CARE EDUCATION/TRAINING PROGRAM

## 2023-08-29 PROCEDURE — 25000003 PHARM REV CODE 250: Performed by: NURSE PRACTITIONER

## 2023-08-29 PROCEDURE — D9220A PRA ANESTHESIA: ICD-10-PCS | Mod: CRNA,,, | Performed by: STUDENT IN AN ORGANIZED HEALTH CARE EDUCATION/TRAINING PROGRAM

## 2023-08-29 PROCEDURE — 88309 TISSUE EXAM BY PATHOLOGIST: CPT | Mod: 26,,, | Performed by: PATHOLOGY

## 2023-08-29 PROCEDURE — 27201423 OPTIME MED/SURG SUP & DEVICES STERILE SUPPLY: Performed by: SURGERY

## 2023-08-29 PROCEDURE — 36000710: Performed by: SURGERY

## 2023-08-29 PROCEDURE — 37000009 HC ANESTHESIA EA ADD 15 MINS: Performed by: SURGERY

## 2023-08-29 PROCEDURE — 71000015 HC POSTOP RECOV 1ST HR: Performed by: SURGERY

## 2023-08-29 RX ORDER — LIDOCAINE HYDROCHLORIDE 20 MG/ML
INJECTION INTRAVENOUS
Status: DISCONTINUED | OUTPATIENT
Start: 2023-08-29 | End: 2023-08-29

## 2023-08-29 RX ORDER — FENTANYL CITRATE 50 UG/ML
INJECTION, SOLUTION INTRAMUSCULAR; INTRAVENOUS
Status: DISCONTINUED | OUTPATIENT
Start: 2023-08-29 | End: 2023-08-29

## 2023-08-29 RX ORDER — OXYCODONE HYDROCHLORIDE 5 MG/1
5 TABLET ORAL EVERY 6 HOURS PRN
Qty: 15 TABLET | Refills: 0 | Status: ON HOLD | OUTPATIENT
Start: 2023-08-29 | End: 2023-10-11 | Stop reason: HOSPADM

## 2023-08-29 RX ORDER — PROCHLORPERAZINE EDISYLATE 5 MG/ML
5 INJECTION INTRAMUSCULAR; INTRAVENOUS EVERY 30 MIN PRN
Status: DISCONTINUED | OUTPATIENT
Start: 2023-08-29 | End: 2023-08-29 | Stop reason: HOSPADM

## 2023-08-29 RX ORDER — KETOROLAC TROMETHAMINE 30 MG/ML
INJECTION, SOLUTION INTRAMUSCULAR; INTRAVENOUS
Status: DISCONTINUED | OUTPATIENT
Start: 2023-08-29 | End: 2023-08-29

## 2023-08-29 RX ORDER — METRONIDAZOLE 500 MG/1
500 TABLET ORAL EVERY 8 HOURS
Qty: 12 TABLET | Refills: 0 | Status: ON HOLD | OUTPATIENT
Start: 2023-08-29 | End: 2023-10-11 | Stop reason: HOSPADM

## 2023-08-29 RX ORDER — ROCURONIUM BROMIDE 10 MG/ML
INJECTION, SOLUTION INTRAVENOUS
Status: DISCONTINUED | OUTPATIENT
Start: 2023-08-29 | End: 2023-08-29

## 2023-08-29 RX ORDER — SODIUM CHLORIDE 0.9 % (FLUSH) 0.9 %
3 SYRINGE (ML) INJECTION
Status: DISCONTINUED | OUTPATIENT
Start: 2023-08-29 | End: 2023-08-29 | Stop reason: HOSPADM

## 2023-08-29 RX ORDER — MEPERIDINE HYDROCHLORIDE 25 MG/ML
12.5 INJECTION INTRAMUSCULAR; INTRAVENOUS; SUBCUTANEOUS ONCE AS NEEDED
Status: COMPLETED | OUTPATIENT
Start: 2023-08-29 | End: 2023-08-29

## 2023-08-29 RX ORDER — DEXAMETHASONE SODIUM PHOSPHATE 4 MG/ML
INJECTION, SOLUTION INTRA-ARTICULAR; INTRALESIONAL; INTRAMUSCULAR; INTRAVENOUS; SOFT TISSUE
Status: DISCONTINUED | OUTPATIENT
Start: 2023-08-29 | End: 2023-08-29

## 2023-08-29 RX ORDER — LIDOCAINE HYDROCHLORIDE 10 MG/ML
1 INJECTION, SOLUTION EPIDURAL; INFILTRATION; INTRACAUDAL; PERINEURAL ONCE
Status: DISCONTINUED | OUTPATIENT
Start: 2023-08-29 | End: 2023-08-29 | Stop reason: HOSPADM

## 2023-08-29 RX ORDER — SODIUM CHLORIDE 9 MG/ML
INJECTION, SOLUTION INTRAVENOUS CONTINUOUS
Status: DISCONTINUED | OUTPATIENT
Start: 2023-08-29 | End: 2023-08-29 | Stop reason: HOSPADM

## 2023-08-29 RX ORDER — PROPOFOL 10 MG/ML
VIAL (ML) INTRAVENOUS
Status: DISCONTINUED | OUTPATIENT
Start: 2023-08-29 | End: 2023-08-29

## 2023-08-29 RX ORDER — HYDROMORPHONE HYDROCHLORIDE 2 MG/ML
0.4 INJECTION, SOLUTION INTRAMUSCULAR; INTRAVENOUS; SUBCUTANEOUS EVERY 5 MIN PRN
Status: DISCONTINUED | OUTPATIENT
Start: 2023-08-29 | End: 2023-08-29 | Stop reason: HOSPADM

## 2023-08-29 RX ORDER — SODIUM CHLORIDE, SODIUM LACTATE, POTASSIUM CHLORIDE, CALCIUM CHLORIDE 600; 310; 30; 20 MG/100ML; MG/100ML; MG/100ML; MG/100ML
INJECTION, SOLUTION INTRAVENOUS CONTINUOUS
Status: DISCONTINUED | OUTPATIENT
Start: 2023-08-29 | End: 2023-08-29 | Stop reason: HOSPADM

## 2023-08-29 RX ORDER — ALBUMIN HUMAN 50 G/1000ML
SOLUTION INTRAVENOUS CONTINUOUS PRN
Status: DISCONTINUED | OUTPATIENT
Start: 2023-08-29 | End: 2023-08-29

## 2023-08-29 RX ORDER — CIPROFLOXACIN 500 MG/1
500 TABLET ORAL EVERY 12 HOURS
Qty: 8 TABLET | Refills: 0 | Status: ON HOLD | OUTPATIENT
Start: 2023-08-29 | End: 2023-10-11 | Stop reason: HOSPADM

## 2023-08-29 RX ORDER — OXYCODONE HYDROCHLORIDE 5 MG/1
5 TABLET ORAL
Status: DISCONTINUED | OUTPATIENT
Start: 2023-08-29 | End: 2023-08-29 | Stop reason: HOSPADM

## 2023-08-29 RX ORDER — LIDOCAINE HYDROCHLORIDE 10 MG/ML
0.5 INJECTION, SOLUTION EPIDURAL; INFILTRATION; INTRACAUDAL; PERINEURAL ONCE
Status: DISCONTINUED | OUTPATIENT
Start: 2023-08-29 | End: 2023-08-29 | Stop reason: HOSPADM

## 2023-08-29 RX ORDER — ACETAMINOPHEN 500 MG
1000 TABLET ORAL
Status: COMPLETED | OUTPATIENT
Start: 2023-08-29 | End: 2023-08-29

## 2023-08-29 RX ORDER — BUPIVACAINE HYDROCHLORIDE 2.5 MG/ML
INJECTION, SOLUTION EPIDURAL; INFILTRATION; INTRACAUDAL
Status: DISCONTINUED | OUTPATIENT
Start: 2023-08-29 | End: 2023-08-29 | Stop reason: HOSPADM

## 2023-08-29 RX ORDER — MIDAZOLAM HYDROCHLORIDE 1 MG/ML
INJECTION INTRAMUSCULAR; INTRAVENOUS
Status: DISCONTINUED | OUTPATIENT
Start: 2023-08-29 | End: 2023-08-29

## 2023-08-29 RX ORDER — METRONIDAZOLE 500 MG/100ML
500 INJECTION, SOLUTION INTRAVENOUS
Status: COMPLETED | OUTPATIENT
Start: 2023-08-29 | End: 2023-08-29

## 2023-08-29 RX ORDER — ONDANSETRON 2 MG/ML
INJECTION INTRAMUSCULAR; INTRAVENOUS
Status: DISCONTINUED | OUTPATIENT
Start: 2023-08-29 | End: 2023-08-29

## 2023-08-29 RX ADMIN — GLYCOPYRROLATE 0.2 MG: 0.2 INJECTION, SOLUTION INTRAMUSCULAR; INTRAVITREAL at 12:08

## 2023-08-29 RX ADMIN — FENTANYL CITRATE 100 MCG: 50 INJECTION, SOLUTION INTRAMUSCULAR; INTRAVENOUS at 12:08

## 2023-08-29 RX ADMIN — MEPERIDINE HYDROCHLORIDE 12.5 MG: 25 INJECTION INTRAMUSCULAR; INTRAVENOUS; SUBCUTANEOUS at 03:08

## 2023-08-29 RX ADMIN — HYDROMORPHONE HYDROCHLORIDE 0.4 MG: 2 INJECTION INTRAMUSCULAR; INTRAVENOUS; SUBCUTANEOUS at 03:08

## 2023-08-29 RX ADMIN — KETOROLAC TROMETHAMINE 30 MG: 30 INJECTION, SOLUTION INTRAMUSCULAR; INTRAVENOUS at 02:08

## 2023-08-29 RX ADMIN — FENTANYL CITRATE 50 MCG: 50 INJECTION, SOLUTION INTRAMUSCULAR; INTRAVENOUS at 02:08

## 2023-08-29 RX ADMIN — ONDANSETRON HYDROCHLORIDE 4 MG: 2 INJECTION INTRAMUSCULAR; INTRAVENOUS at 12:08

## 2023-08-29 RX ADMIN — SUGAMMADEX 200 MG: 100 INJECTION, SOLUTION INTRAVENOUS at 02:08

## 2023-08-29 RX ADMIN — METRONIDAZOLE 500 MG: 500 INJECTION, SOLUTION INTRAVENOUS at 12:08

## 2023-08-29 RX ADMIN — FENTANYL CITRATE 50 MCG: 50 INJECTION, SOLUTION INTRAMUSCULAR; INTRAVENOUS at 01:08

## 2023-08-29 RX ADMIN — LIDOCAINE HYDROCHLORIDE 50 MG: 20 INJECTION, SOLUTION INTRAVENOUS at 12:08

## 2023-08-29 RX ADMIN — ACETAMINOPHEN 1000 MG: 500 TABLET ORAL at 10:08

## 2023-08-29 RX ADMIN — ROCURONIUM BROMIDE 20 MG: 10 INJECTION INTRAVENOUS at 02:08

## 2023-08-29 RX ADMIN — MIDAZOLAM HYDROCHLORIDE 2 MG: 1 INJECTION, SOLUTION INTRAMUSCULAR; INTRAVENOUS at 12:08

## 2023-08-29 RX ADMIN — SODIUM CHLORIDE, SODIUM LACTATE, POTASSIUM CHLORIDE, AND CALCIUM CHLORIDE: 600; 310; 30; 20 INJECTION, SOLUTION INTRAVENOUS at 12:08

## 2023-08-29 RX ADMIN — ALBUMIN (HUMAN): 2.5 SOLUTION INTRAVENOUS at 02:08

## 2023-08-29 RX ADMIN — PROPOFOL 200 MG: 10 INJECTION, EMULSION INTRAVENOUS at 12:08

## 2023-08-29 RX ADMIN — ROCURONIUM BROMIDE 50 MG: 10 INJECTION INTRAVENOUS at 12:08

## 2023-08-29 RX ADMIN — DEXAMETHASONE SODIUM PHOSPHATE 8 MG: 4 INJECTION, SOLUTION INTRAMUSCULAR; INTRAVENOUS at 12:08

## 2023-08-29 RX ADMIN — CEFTRIAXONE SODIUM 2 G: 2 INJECTION, POWDER, FOR SOLUTION INTRAMUSCULAR; INTRAVENOUS at 12:08

## 2023-08-29 RX ADMIN — OXYCODONE HYDROCHLORIDE 5 MG: 5 TABLET ORAL at 03:08

## 2023-08-29 RX ADMIN — ROCURONIUM BROMIDE 20 MG: 10 INJECTION INTRAVENOUS at 01:08

## 2023-08-29 NOTE — ANESTHESIA PROCEDURE NOTES
Intubation    Date/Time: 8/29/2023 12:42 PM    Performed by: Horacio Disla CRNA  Authorized by: John Rojo MD    Intubation:     Induction:  Intravenous    Intubated:  Postinduction    Mask Ventilation:  Easy mask    Attempts:  1    Attempted By:  CRNA    Method of Intubation:  Video laryngoscopy    Blade:  Dorado 3    Laryngeal View Grade: Grade I - full view of cords      Difficult Airway Encountered?: No      Complications:  None    Airway Device:  Oral endotracheal tube    Airway Device Size:  7.0    Style/Cuff Inflation:  Cuffed (inflated to minimal occlusive pressure)    Inflation Amount (mL):  5    Tube secured:  21    Secured at:  The lips    Placement Verified By:  Capnometry    Complicating Factors:  None    Findings Post-Intubation:  BS equal bilateral and atraumatic/condition of teeth unchanged

## 2023-08-29 NOTE — ANESTHESIA POSTPROCEDURE EVALUATION
Anesthesia Post Evaluation    Patient: Shruthi Churchill    Procedure(s) Performed: Procedure(s) (LRB):  ROBOTIC TAMIS (TRANSANAL MINIMALLY INVASIVE SURGERY) (N/A)    Final Anesthesia Type: general      Patient location during evaluation: PACU  Patient participation: Yes- Able to Participate  Level of consciousness: awake and alert  Post-procedure vital signs: reviewed and stable  Pain management: adequate  Airway patency: patent  AZEEM mitigation strategies: Extubation while patient is awake  PONV status at discharge: No PONV  Anesthetic complications: no      Cardiovascular status: hemodynamically stable  Respiratory status: unassisted  Hydration status: euvolemic  Follow-up not needed.          Vitals Value Taken Time   /76 08/29/23 1554   Temp 36.7 °C (98 °F) 08/29/23 1546   Pulse 60 08/29/23 1554   Resp 18 08/29/23 1554   SpO2 100 % 08/29/23 1554         Event Time   Out of Recovery 16:07:36         Pain/Shala Score: Pain Rating Prior to Med Admin: 7 (8/29/2023  3:41 PM)  Pain Rating Post Med Admin: 2 (8/29/2023  3:55 PM)  Shala Score: 10 (8/29/2023  3:55 PM)

## 2023-08-29 NOTE — PLAN OF CARE
Shruthi Churchill has met all discharge criteria from Phase II. Vital Signs are stable, ambulating  without difficulty. Discharge instructions given, patient verbalized understanding. Discharged from facility via wheelchair in stable condition.

## 2023-08-29 NOTE — OP NOTE
Date of surgery: 08/29/2023    Operative Report  Pre-operative diagnosis: rectal polyp  Post-operative diagnosis: Same as above    Procedure:  Robotic transanal excision of rectal polyp     Findings:  9 cm rectal polyp      Surgeon: Barbara Zaldivar MD   Assistant:  Hector Richardson MD    Indication: Ms. Churchill is a 43 year old woman with a history of endoscopically unresectable rectal polyp  who is scheduled to undergo robotic transanal resection. The benefits, risks, and alternatives were discussed with the patient, they were given the opportunity to ask questions and they elected to proceed with operative intervention after signing written consent.    Procedure:  After pre-operative assessment and review of informed consent, the patient was taken to the operating room and received general anesthesia. Pre-operative antibiotics were administered if indicated and the patient was placed in the prone position with all bony prominences appropriately padded and joints in appropriate position. The perineum was prepped and draped in the usual sterile fashion and a timeout was performed according to [facility] Quality and Safety guidelines.      Externally, the patient was noted to have near circumferential external hemorrhoids with mucosal prolapse.  Anoscopy was performed with a bullet anoscope and the polyp was visualized.  It appeared that we had adequate clearance of the anorectal ring to proceed with placement of the transanal platform.  This was placed and secured in place with vicryl sutures.  The trocars were placed in the cap and the cap secured in place.  Insufflation was turned on and the rectum was appropriately insufflated.      The [employer] robot was brought onto the field and docked.  The bipolar grasper and monopolar scissors were introduced to the field.  We scored the mucosa circumferentially with a negative margin.  We then proceeded with partial thickness resection, leaving the muscularis propria intact.   The polyp was removed intact.  The wound bed was irrigated and hemostasis was appropriate.  The robotic instruments were removed and the robot was undocked.  Insufflation was evacuated and the polyp was grasped with a idris and removed.  This was sent to pathology.  The transanal platform was removed.  Anoscopy was performed and there was no evidence of active bleeding.      Prior to closure and after final closure instrument, needle, and sponge counts were correct.    The procedure was completed without complication and was well-tolerated. The patient was then brought to the post-anesthesia care unit in stable condition. I was present for the entire operation.    Complications: None  Estimated blood loss: 20 mL  Disposition: PACU    Barbara Zaldivar MD  Staff Surgeon   Colon & Rectal Surgery

## 2023-08-29 NOTE — INTERVAL H&P NOTE
The patient has been examined and the H&P has been reviewed:    I concur with the findings and no changes have occurred since H&P was written.    Surgery risks, benefits and alternative options discussed and understood by patient/family.    To OR for Robotic TAMIS of rectal polyp.    Hector Richardson MD  Colon and Rectal Surgery Fellow

## 2023-08-29 NOTE — TRANSFER OF CARE
Anesthesia Transfer of Care Note    Patient: Shruthi Churchill    Procedure(s) Performed: Procedure(s) (LRB):  ROBOTIC TAMIS (TRANSANAL MINIMALLY INVASIVE SURGERY) (N/A)    Patient location: PACU    Anesthesia Type: general    Transport from OR: Transported from OR on 6-10 L/min O2 by face mask with adequate spontaneous ventilation    Post pain: adequate analgesia    Post assessment: no apparent anesthetic complications and tolerated procedure well    Post vital signs: stable    Level of consciousness: awake and alert    Nausea/Vomiting: no nausea/vomiting    Complications: none    Transfer of care protocol was followed      Last vitals:   Visit Vitals  /86 (BP Location: Left arm, Patient Position: Lying)   Pulse 69   Temp 36.1 °C (97 °F) (Skin)   Resp 20   SpO2 99%   Breastfeeding No

## 2023-08-29 NOTE — PATIENT INSTRUCTIONS
Ok to shower. You may notice some blood streaking on stools, this is normal for the first several days. If you develop large amount of clots or blood in stool please call the office. If you develop intractable abdominal pain, nausea, vomiting, fever, or chills please call the office or return to ED. Please be sure to complete all 4 days of antibiotics.

## 2023-08-29 NOTE — BRIEF OP NOTE
[facility], operated by [facility] ([place]  Brief Operative Note    Surgery Date: 8/29/2023     Surgeon(s) and Role:     * Barbara Malcolm MD - Primary    Assisting Surgeon: Hector Richardson MD - Fellow    Pre-op Diagnosis:  Tumor of rectum [D49.0]    Post-op Diagnosis:  Post-Op Diagnosis Codes:     * Tumor of rectum [D49.0]    Procedure(s) (LRB):  ROBOTIC TAMIS (TRANSANAL MINIMALLY INVASIVE SURGERY) (N/A)    Anesthesia: General    Operative Findings: 9cm polyp in lower rectum. Mucosal layer was dissected off the muscularis and polyp completely resected. Hemostasis achieved. Wound left open.     Estimated Blood Loss: 20mL         Specimens:   Specimen (24h ago, onward)       Start     Ordered    08/29/23 1443  Specimen to Pathology, Surgery General Surgery  Once        Comments: Pre-op Diagnosis: Tumor of rectum [D49.0]Procedure(s):ROBOTIC TAMIS (TRANSANAL MINIMALLY INVASIVE SURGERY) Number of specimens: 1Name of specimens: 1. Rectal Polyp     References:    Click here for ordering Quick Tip   Question Answer Comment   Procedure Type: General Surgery    Specimen Class: Routine/Screening    Which provider would you like to cc? BARBARA MALCOLM    Release to patient Immediate        08/29/23 1443                      Discharge Note    OUTCOME: Patient tolerated treatment/procedure well without complication and is now ready for discharge.    DISPOSITION: Home or Self Care    FINAL DIAGNOSIS:  Rectal polyp    FOLLOWUP: In clinic 2 weeks    DISCHARGE INSTRUCTIONS:    Discharge Procedure Orders   Diet Adult Regular     Notify your health care provider if you experience any of the following:  temperature >100.4     Notify your health care provider if you experience any of the following:  persistent nausea and vomiting or diarrhea     Notify your health care provider if you experience any of the following:  severe uncontrolled pain     Activity as tolerated

## 2023-08-30 VITALS
OXYGEN SATURATION: 100 % | HEART RATE: 77 BPM | DIASTOLIC BLOOD PRESSURE: 74 MMHG | TEMPERATURE: 98 F | SYSTOLIC BLOOD PRESSURE: 136 MMHG | RESPIRATION RATE: 18 BRPM

## 2023-09-07 LAB
FINAL PATHOLOGIC DIAGNOSIS: NORMAL
GROSS: NORMAL
Lab: NORMAL

## 2023-09-12 ENCOUNTER — TELEPHONE (OUTPATIENT)
Dept: SURGERY | Facility: CLINIC | Age: 43
End: 2023-09-12
Payer: MEDICAID

## 2023-09-12 NOTE — TELEPHONE ENCOUNTER
Called patient regarding pathology of benign TVA.  Patient otherwise doing well.  Has follow up appt on Friday.     Barbara Zaldivar MD  Staff Surgeon   Colon & Rectal Surgery

## 2023-09-18 ENCOUNTER — OFFICE VISIT (OUTPATIENT)
Dept: SURGERY | Facility: CLINIC | Age: 43
End: 2023-09-18
Payer: MEDICAID

## 2023-09-18 VITALS
HEART RATE: 76 BPM | SYSTOLIC BLOOD PRESSURE: 130 MMHG | OXYGEN SATURATION: 98 % | BODY MASS INDEX: 22.18 KG/M2 | HEIGHT: 64 IN | RESPIRATION RATE: 19 BRPM | DIASTOLIC BLOOD PRESSURE: 82 MMHG | WEIGHT: 129.94 LBS

## 2023-09-18 DIAGNOSIS — K64.3 GRADE IV HEMORRHOIDS: Primary | ICD-10-CM

## 2023-09-18 PROCEDURE — 99999 PR PBB SHADOW E&M-EST. PATIENT-LVL IV: CPT | Mod: PBBFAC,,, | Performed by: SURGERY

## 2023-09-18 PROCEDURE — 3008F PR BODY MASS INDEX (BMI) DOCUMENTED: ICD-10-PCS | Mod: CPTII,,, | Performed by: SURGERY

## 2023-09-18 PROCEDURE — 3075F SYST BP GE 130 - 139MM HG: CPT | Mod: CPTII,,, | Performed by: SURGERY

## 2023-09-18 PROCEDURE — 1159F MED LIST DOCD IN RCRD: CPT | Mod: CPTII,,, | Performed by: SURGERY

## 2023-09-18 PROCEDURE — 3008F BODY MASS INDEX DOCD: CPT | Mod: CPTII,,, | Performed by: SURGERY

## 2023-09-18 PROCEDURE — 99999 PR PBB SHADOW E&M-EST. PATIENT-LVL IV: ICD-10-PCS | Mod: PBBFAC,,, | Performed by: SURGERY

## 2023-09-18 PROCEDURE — 3079F PR MOST RECENT DIASTOLIC BLOOD PRESSURE 80-89 MM HG: ICD-10-PCS | Mod: CPTII,,, | Performed by: SURGERY

## 2023-09-18 PROCEDURE — 99024 POSTOP FOLLOW-UP VISIT: CPT | Mod: ,,, | Performed by: SURGERY

## 2023-09-18 PROCEDURE — 99214 OFFICE O/P EST MOD 30 MIN: CPT | Mod: PBBFAC | Performed by: SURGERY

## 2023-09-18 PROCEDURE — 3075F PR MOST RECENT SYSTOLIC BLOOD PRESS GE 130-139MM HG: ICD-10-PCS | Mod: CPTII,,, | Performed by: SURGERY

## 2023-09-18 PROCEDURE — 1159F PR MEDICATION LIST DOCUMENTED IN MEDICAL RECORD: ICD-10-PCS | Mod: CPTII,,, | Performed by: SURGERY

## 2023-09-18 PROCEDURE — 99024 PR POST-OP FOLLOW-UP VISIT: ICD-10-PCS | Mod: ,,, | Performed by: SURGERY

## 2023-09-18 PROCEDURE — 3079F DIAST BP 80-89 MM HG: CPT | Mod: CPTII,,, | Performed by: SURGERY

## 2023-09-18 RX ORDER — CALCIUM POLYCARBOPHIL 625 MG
TABLET ORAL
Qty: 120 TABLET | Refills: 3 | Status: SHIPPED | OUTPATIENT
Start: 2023-09-18

## 2023-09-21 ENCOUNTER — TELEPHONE (OUTPATIENT)
Dept: SURGERY | Facility: CLINIC | Age: 43
End: 2023-09-21
Payer: MEDICAID

## 2023-09-22 NOTE — PROGRESS NOTES
Colon & Rectal Surgery Clinic Follow Up    HPI:   Shruthi Churchill is a 43 y.o. female who presents for follow up after robotic TAMIS on 8/29 for large TVA.  Pathology reviewed without evidence of dysplasia.  Patient has recovered well from procedure.  Is now interested in having hemorrhoids excised due to mucoid drainage / fecal seepage.       Objective:   Vitals:    09/18/23 1542   BP: 130/82   Pulse: 76   Resp: 19        Physical Exam   Gen: well developed female, NAD  HEENT: normocephalic, atraumatic, PERRL, EOMI  CV: RRR, no murmurs  Resp: nonlabored, CTAB  Abd: soft, NTND  MSK: no gross deformities   Anorectal: near circumferential internal and external hemorrhoids, SANTOS with intact rectal tone, increase in tone with squeeze    Pathology:   Rectum, polyp, transanal excision:   Tubulovillous adenoma.     Margins of excision appear free of dysplasia in well-oriented planes of section.     No definitive high-grade dysplasia or invasive malignancy identified.       Assessment and Plan:   Shruthi Churchill  is a 43 y.o. female who presents for follow up after robotic TAMIS for TVA.     - patient recovered well from TAMIS.  Pathology reviewed and benign.  Will plan to perform surveillance flex sig at 6 months  - patient with symptomatic hemorrhoids, reports pain and significant drainage  - given her significant disease burden, she would require excisional hemorrhoidectomy.  We discussed post-operative pain expectations, the likelihood of residual hemorrhoid tissue and cosmetic expectations, the risk of bleeding, and infection.  All questions answered and consent obtained.       Barbara Zaldivar MD  Staff Surgeon   Colon & Rectal Surgery

## 2023-10-02 PROBLEM — K92.2 ACUTE UPPER GI BLEEDING: Status: RESOLVED | Noted: 2023-06-26 | Resolved: 2023-10-02

## 2023-10-09 ENCOUNTER — ANESTHESIA EVENT (OUTPATIENT)
Dept: SURGERY | Facility: OTHER | Age: 43
End: 2023-10-09
Payer: MEDICAID

## 2023-10-10 ENCOUNTER — TELEPHONE (OUTPATIENT)
Dept: SURGERY | Facility: CLINIC | Age: 43
End: 2023-10-10
Payer: MEDICAID

## 2023-10-10 NOTE — TELEPHONE ENCOUNTER
Attempted to contact pt regarding 1000 arrival time and location of procedure. No answer. Left message with time, location, and instructions. CRS number provided to return call.

## 2023-10-10 NOTE — PRE-PROCEDURE INSTRUCTIONS
Pre admit phone call completed.    Instructions given to patient about NPO status as follows:     The evening before surgery do not eat anything after 9 p.m. ( this includes hard candy, chewing gum and mints).  You may only have GATORADE, POWERADE AND WATER from 9 p.m. until you leave your home. DO NOT  DRINK ANY LIQUIDS ON THE WAY TO THE HOSPITAL.      Patient was also instructed on the below information:    Park in the Parking lot behind the hospital or in the [employer] [facility] across the street from the parking lot.  Parking is complimentary.  If you will be discharged the same day as your procedure, please arrange for a responsible adult to drive you home or  to accompany you if traveling by taxi.  YOU WILL NOT BE PERMITTED TO DRIVE OR TO LEAVE THE HOSPITAL ALONE AFTER SURGERY.  It is strongly recommended that you arrange for someone to remain with you for the first 24 hrs following your surgery.    Patient verbalized understanding of above instructions.

## 2023-10-11 ENCOUNTER — ANESTHESIA (OUTPATIENT)
Dept: SURGERY | Facility: OTHER | Age: 43
End: 2023-10-11
Payer: MEDICAID

## 2023-10-11 ENCOUNTER — HOSPITAL ENCOUNTER (OUTPATIENT)
Facility: OTHER | Age: 43
Discharge: HOME | End: 2023-10-11
Attending: SURGERY | Admitting: SURGERY
Payer: MEDICAID

## 2023-10-11 VITALS
WEIGHT: 128 LBS | DIASTOLIC BLOOD PRESSURE: 58 MMHG | TEMPERATURE: 98 F | RESPIRATION RATE: 16 BRPM | OXYGEN SATURATION: 96 % | HEART RATE: 77 BPM | SYSTOLIC BLOOD PRESSURE: 101 MMHG | HEIGHT: 64 IN | BODY MASS INDEX: 21.85 KG/M2

## 2023-10-11 DIAGNOSIS — K64.9 HEMORRHOIDS: ICD-10-CM

## 2023-10-11 DIAGNOSIS — K64.3 GRADE IV HEMORRHOIDS: Primary | ICD-10-CM

## 2023-10-11 PROCEDURE — 37000009 HC ANESTHESIA EA ADD 15 MINS: Performed by: SURGERY

## 2023-10-11 PROCEDURE — 63600175 PHARM REV CODE 636 W HCPCS: Performed by: ANESTHESIOLOGY

## 2023-10-11 PROCEDURE — 25000003 PHARM REV CODE 250: Performed by: NURSE ANESTHETIST, CERTIFIED REGISTERED

## 2023-10-11 PROCEDURE — 71000015 HC POSTOP RECOV 1ST HR: Performed by: SURGERY

## 2023-10-11 PROCEDURE — D9220A PRA ANESTHESIA: Mod: ANES,,, | Performed by: ANESTHESIOLOGY

## 2023-10-11 PROCEDURE — 71000033 HC RECOVERY, INTIAL HOUR: Performed by: SURGERY

## 2023-10-11 PROCEDURE — 63600175 PHARM REV CODE 636 W HCPCS: Performed by: NURSE ANESTHETIST, CERTIFIED REGISTERED

## 2023-10-11 PROCEDURE — 36000707: Performed by: SURGERY

## 2023-10-11 PROCEDURE — D9220A PRA ANESTHESIA: ICD-10-PCS | Mod: CRNA,,, | Performed by: NURSE ANESTHETIST, CERTIFIED REGISTERED

## 2023-10-11 PROCEDURE — 63600175 PHARM REV CODE 636 W HCPCS: Performed by: SURGERY

## 2023-10-11 PROCEDURE — 46260 PR HEMORRHOIDECTOMY,INT/EXT, 2+ COLUMNS/GROUPS: ICD-10-PCS | Mod: ,,, | Performed by: SURGERY

## 2023-10-11 PROCEDURE — 71000039 HC RECOVERY, EACH ADD'L HOUR: Performed by: SURGERY

## 2023-10-11 PROCEDURE — 25000003 PHARM REV CODE 250: Performed by: ANESTHESIOLOGY

## 2023-10-11 PROCEDURE — 88304 TISSUE EXAM BY PATHOLOGIST: CPT | Performed by: PATHOLOGY

## 2023-10-11 PROCEDURE — 71000016 HC POSTOP RECOV ADDL HR: Performed by: SURGERY

## 2023-10-11 PROCEDURE — D9220A PRA ANESTHESIA: ICD-10-PCS | Mod: ANES,,, | Performed by: ANESTHESIOLOGY

## 2023-10-11 PROCEDURE — 46260 REMOVE IN/EX HEM GROUPS 2+: CPT | Mod: ,,, | Performed by: SURGERY

## 2023-10-11 PROCEDURE — 25000003 PHARM REV CODE 250: Performed by: NURSE PRACTITIONER

## 2023-10-11 PROCEDURE — 88304 PR  SURG PATH,LEVEL III: ICD-10-PCS | Mod: 26,,, | Performed by: PATHOLOGY

## 2023-10-11 PROCEDURE — 37000008 HC ANESTHESIA 1ST 15 MINUTES: Performed by: SURGERY

## 2023-10-11 PROCEDURE — D9220A PRA ANESTHESIA: Mod: CRNA,,, | Performed by: NURSE ANESTHETIST, CERTIFIED REGISTERED

## 2023-10-11 PROCEDURE — 88304 TISSUE EXAM BY PATHOLOGIST: CPT | Mod: 26,,, | Performed by: PATHOLOGY

## 2023-10-11 PROCEDURE — 36000706: Performed by: SURGERY

## 2023-10-11 RX ORDER — PROCHLORPERAZINE EDISYLATE 5 MG/ML
5 INJECTION INTRAMUSCULAR; INTRAVENOUS EVERY 30 MIN PRN
Status: DISCONTINUED | OUTPATIENT
Start: 2023-10-11 | End: 2023-10-11 | Stop reason: HOSPADM

## 2023-10-11 RX ORDER — LIDOCAINE HYDROCHLORIDE 10 MG/ML
1 INJECTION, SOLUTION EPIDURAL; INFILTRATION; INTRACAUDAL; PERINEURAL ONCE
Status: ACTIVE | OUTPATIENT
Start: 2023-10-11

## 2023-10-11 RX ORDER — MEPERIDINE HYDROCHLORIDE 25 MG/ML
12.5 INJECTION INTRAMUSCULAR; INTRAVENOUS; SUBCUTANEOUS ONCE AS NEEDED
Status: DISCONTINUED | OUTPATIENT
Start: 2023-10-11 | End: 2023-10-11 | Stop reason: HOSPADM

## 2023-10-11 RX ORDER — LIDOCAINE HYDROCHLORIDE 10 MG/ML
INJECTION, SOLUTION INTRAVENOUS
Status: DISCONTINUED | OUTPATIENT
Start: 2023-10-11 | End: 2023-10-11

## 2023-10-11 RX ORDER — CLONAZEPAM 0.5 MG/1
0.5 TABLET ORAL 2 TIMES DAILY PRN
COMMUNITY
End: 2023-10-12

## 2023-10-11 RX ORDER — SODIUM CHLORIDE 9 MG/ML
INJECTION, SOLUTION INTRAVENOUS CONTINUOUS
Status: ACTIVE | OUTPATIENT
Start: 2023-10-11

## 2023-10-11 RX ORDER — OXYCODONE HYDROCHLORIDE 5 MG/1
5 TABLET ORAL
Status: DISCONTINUED | OUTPATIENT
Start: 2023-10-11 | End: 2023-10-11 | Stop reason: HOSPADM

## 2023-10-11 RX ORDER — DEXAMETHASONE SODIUM PHOSPHATE 4 MG/ML
INJECTION, SOLUTION INTRA-ARTICULAR; INTRALESIONAL; INTRAMUSCULAR; INTRAVENOUS; SOFT TISSUE
Status: DISCONTINUED | OUTPATIENT
Start: 2023-10-11 | End: 2023-10-11

## 2023-10-11 RX ORDER — FENTANYL CITRATE 50 UG/ML
INJECTION, SOLUTION INTRAMUSCULAR; INTRAVENOUS
Status: DISCONTINUED | OUTPATIENT
Start: 2023-10-11 | End: 2023-10-11

## 2023-10-11 RX ORDER — ONDANSETRON 2 MG/ML
INJECTION INTRAMUSCULAR; INTRAVENOUS
Status: DISCONTINUED | OUTPATIENT
Start: 2023-10-11 | End: 2023-10-11

## 2023-10-11 RX ORDER — SODIUM CHLORIDE, SODIUM LACTATE, POTASSIUM CHLORIDE, CALCIUM CHLORIDE 600; 310; 30; 20 MG/100ML; MG/100ML; MG/100ML; MG/100ML
INJECTION, SOLUTION INTRAVENOUS CONTINUOUS
Status: DISCONTINUED | OUTPATIENT
Start: 2023-10-11 | End: 2023-10-11 | Stop reason: HOSPADM

## 2023-10-11 RX ORDER — LIDOCAINE HYDROCHLORIDE 10 MG/ML
0.5 INJECTION, SOLUTION EPIDURAL; INFILTRATION; INTRACAUDAL; PERINEURAL ONCE
Status: DISCONTINUED | OUTPATIENT
Start: 2023-10-11 | End: 2023-10-11 | Stop reason: HOSPADM

## 2023-10-11 RX ORDER — PROPOFOL 10 MG/ML
VIAL (ML) INTRAVENOUS
Status: DISCONTINUED | OUTPATIENT
Start: 2023-10-11 | End: 2023-10-11

## 2023-10-11 RX ORDER — SODIUM CHLORIDE 0.9 % (FLUSH) 0.9 %
3 SYRINGE (ML) INJECTION
Status: DISCONTINUED | OUTPATIENT
Start: 2023-10-11 | End: 2023-10-11 | Stop reason: HOSPADM

## 2023-10-11 RX ORDER — HYDROMORPHONE HYDROCHLORIDE 2 MG/ML
0.4 INJECTION, SOLUTION INTRAMUSCULAR; INTRAVENOUS; SUBCUTANEOUS EVERY 5 MIN PRN
Status: DISCONTINUED | OUTPATIENT
Start: 2023-10-11 | End: 2023-10-11 | Stop reason: HOSPADM

## 2023-10-11 RX ORDER — BUPIVACAINE HYDROCHLORIDE 2.5 MG/ML
INJECTION, SOLUTION EPIDURAL; INFILTRATION; INTRACAUDAL
Status: DISCONTINUED | OUTPATIENT
Start: 2023-10-11 | End: 2023-10-11 | Stop reason: HOSPADM

## 2023-10-11 RX ORDER — MIDAZOLAM HYDROCHLORIDE 1 MG/ML
INJECTION INTRAMUSCULAR; INTRAVENOUS
Status: DISCONTINUED | OUTPATIENT
Start: 2023-10-11 | End: 2023-10-11

## 2023-10-11 RX ORDER — MUPIROCIN 20 MG/G
OINTMENT TOPICAL
Status: DISPENSED | OUTPATIENT
Start: 2023-10-11

## 2023-10-11 RX ORDER — OXYCODONE HYDROCHLORIDE 5 MG/1
5 TABLET ORAL EVERY 4 HOURS PRN
Qty: 40 TABLET | Refills: 0 | Status: SHIPPED | OUTPATIENT
Start: 2023-10-11 | End: 2023-10-12 | Stop reason: SDUPTHER

## 2023-10-11 RX ADMIN — HYDROMORPHONE HYDROCHLORIDE 0.4 MG: 2 INJECTION INTRAMUSCULAR; INTRAVENOUS; SUBCUTANEOUS at 01:10

## 2023-10-11 RX ADMIN — OXYCODONE HYDROCHLORIDE 5 MG: 5 TABLET ORAL at 12:10

## 2023-10-11 RX ADMIN — CARBOXYMETHYLCELLULOSE SODIUM 2 DROP: 2.5 SOLUTION/ DROPS OPHTHALMIC at 11:10

## 2023-10-11 RX ADMIN — MIDAZOLAM HYDROCHLORIDE 2 MG: 1 INJECTION, SOLUTION INTRAMUSCULAR; INTRAVENOUS at 11:10

## 2023-10-11 RX ADMIN — SODIUM CHLORIDE, SODIUM LACTATE, POTASSIUM CHLORIDE, AND CALCIUM CHLORIDE: 600; 310; 30; 20 INJECTION, SOLUTION INTRAVENOUS at 11:10

## 2023-10-11 RX ADMIN — ONDANSETRON HYDROCHLORIDE 4 MG: 2 INJECTION INTRAMUSCULAR; INTRAVENOUS at 11:10

## 2023-10-11 RX ADMIN — MUPIROCIN: 20 OINTMENT TOPICAL at 10:10

## 2023-10-11 RX ADMIN — FENTANYL CITRATE 100 MCG: 50 INJECTION, SOLUTION INTRAMUSCULAR; INTRAVENOUS at 11:10

## 2023-10-11 RX ADMIN — PROPOFOL 150 MG: 10 INJECTION, EMULSION INTRAVENOUS at 11:10

## 2023-10-11 RX ADMIN — HYDROMORPHONE HYDROCHLORIDE 0.4 MG: 2 INJECTION INTRAMUSCULAR; INTRAVENOUS; SUBCUTANEOUS at 12:10

## 2023-10-11 RX ADMIN — LIDOCAINE HYDROCHLORIDE 50 MG: 10 INJECTION, SOLUTION INTRAVENOUS at 11:10

## 2023-10-11 RX ADMIN — DEXAMETHASONE SODIUM PHOSPHATE 8 MG: 4 INJECTION, SOLUTION INTRAMUSCULAR; INTRAVENOUS at 11:10

## 2023-10-11 NOTE — TRANSFER OF CARE
"Anesthesia Transfer of Care Note    Patient: Shruthi Churchill    Procedure(s) Performed: Procedure(s) (LRB):  HEMORRHOIDECTOMY, excisional (N/A)    Patient location: PACU    Anesthesia Type: general    Transport from OR: Transported from OR on 2-3 L/min O2 by NC with adequate spontaneous ventilation    Post pain: adequate analgesia    Post assessment: no apparent anesthetic complications    Post vital signs: stable    Level of consciousness: awake, alert and oriented    Nausea/Vomiting: no nausea/vomiting    Complications: none    Transfer of care protocol was followed      Last vitals:   Visit Vitals  /88   Pulse 75   Temp 36.9 °C (98.4 °F) (Skin)   Resp 16   Ht 5' 4" (1.626 m)   Wt 58.1 kg (128 lb)   LMP 09/20/2023 (Approximate)   SpO2 100%   Breastfeeding No   BMI 21.97 kg/m²     "

## 2023-10-11 NOTE — DISCHARGE SUMMARY
[facility] ([place])  Discharge Note  Short Stay    Procedure(s) (LRB):  HEMORRHOIDECTOMY, excisional (N/A)      OUTCOME: Patient tolerated treatment/procedure well without complication and is now ready for discharge.    DISPOSITION: Home or Self Care    FINAL DIAGNOSIS: hemorrhoids    FOLLOWUP: In clinic    DISCHARGE INSTRUCTIONS:    Anal Surgery Post Op Instructions:    1. Take tylenol and ibuprofen for pain.  Take prescription medication for breakthrough pain.  Take sitz baths as needed for comfort.   2. Take capful of miralax daily while taking pain medication.  Increase to once capful twice a day if no bowel movement in 24-48 hours.   3.  Some blood per rectum is expected after this procedure and may last for 1-2 weeks post op.  Call if bleeding is > 1 cup.   4. Call [phone] for worsening pain, inability to urinate or fever > 101.  Call or [facility] for 4 weeks after surgery     Barbara Zaldivar MD  Staff Surgeon  Colon & Rectal Surgery

## 2023-10-11 NOTE — OP NOTE
Date of surgery: 10/11/2023    Operative Report  Pre-operative diagnosis: internal and external hemorrhoids  Post-operative diagnosis: Same as above    Procedure:  Internal and external 2 column hemorrhoidectomy    Findings:  Internal and external hemorrhoids in the right and left lateral positions         Surgeon: Barbara Zaldivar MD   Assistant:  none    Indication: Ms. Churchill is a 43 year old woman with a history of grade IV internal and external hemorrhoids  who is scheduled to undergo excisional hemorrhoidectomy. The benefits, risks, and alternatives were discussed with the patient, they were given the opportunity to ask questions and they elected to proceed with operative intervention after signing written consent.    Procedure:  After pre-operative assessment and review of informed consent, the patient was taken to the operating room and received general anesthesia. Pre-operative antibiotics were administered if indicated and the patient was placed in lithotomy position. The perineum was prepped and draped in the usual sterile fashion and a timeout was performed according to [facility] Quality and Safety guidelines.      A perianal block was performed.  The patient's most significant hemorrhoids were in the right and left posterolateral positions.  The left hemorrhoid column was grasped at the anal verge and dissected away from the underlying sphincter complex.  The hemorrhoid was then ligated and divided with the ligasure.  Due to the size of the excision bed, the apex was suture ligated with a vicryl suture and closed in a running fashion with a vicryl.  This was repeated for the right lateral column.  Anoscopy revealed a significant internal hemorrhoid in the anterior midline.  This was suture ligated with a vicryl suture.  The anal canal was irrigated and hemostasis obtained.     Prior to closure and after final closure instrument, needle, and sponge counts were correct.    The procedure was completed  without complication and was well-tolerated. The patient was then brought to the post-anesthesia care unit in stable condition. I was present for the entire operation.    Complications: None  Estimated blood loss: 20 mL  Disposition: PACU    Barbara Zaldivar MD  Staff Surgeon   Colon & Rectal Surgery

## 2023-10-11 NOTE — ANESTHESIA POSTPROCEDURE EVALUATION
Anesthesia Post Evaluation    Patient: Shruthi Churchill    Procedure(s) Performed: Procedure(s) (LRB):  HEMORRHOIDECTOMY, excisional (N/A)    Final Anesthesia Type: general      Patient location during evaluation: PACU  Patient participation: Yes- Able to Participate  Level of consciousness: awake and alert  Post-procedure vital signs: reviewed and stable  Pain management: adequate  Airway patency: patent  AZEEM mitigation strategies: Extubation while patient is awake  PONV status at discharge: No PONV  Anesthetic complications: no      Cardiovascular status: hemodynamically stable  Respiratory status: unassisted  Hydration status: euvolemic  Follow-up not needed.          Vitals Value Taken Time   /58 10/11/23 1347   Temp 36.9 °C (98.4 °F) 10/11/23 1241   Pulse 73 10/11/23 1357   Resp 16 10/11/23 1346   SpO2 97 % 10/11/23 1357   Vitals shown include unvalidated device data.      Event Time   Out of Recovery 14:00:00         Pain/Shala Score: Pain Rating Prior to Med Admin: 7 (10/11/2023  1:46 PM)  Shala Score: 10 (10/11/2023  1:41 PM)

## 2023-10-11 NOTE — ANESTHESIA PREPROCEDURE EVALUATION
10/11/2023  Shruthi Churchill is a 43 y.o., female.      Pre-op Assessment    I have reviewed the Patient Summary Reports.     I have reviewed the Nursing Notes. I have reviewed the NPO Status.   I have reviewed the Medications.     Review of Systems  Anesthesia Hx:  Denies Family Hx of Anesthesia complications.   Denies Personal Hx of Anesthesia complications.   Social:  Non-Smoker    Hematology/Oncology:     Oncology Normal    -- Anemia: Hematology Comments: Last Hb 8.6    Cardiovascular:   Hypertension    Pulmonary:   Shortness of breath Pt reports new onset of SOB not associated with exertion.  Will refer to primary care.   Renal/:  Renal/ Normal     Hepatic/GI:   Liver Disease, (Gilbert's)    Musculoskeletal:  Musculoskeletal Normal    Neurological:  Neurology Normal    Endocrine:  Endocrine Normal    Psych:   anxiety          Physical Exam  General: Well nourished, Cooperative, Alert and Oriented    Airway:  Mallampati: II   Mouth Opening: Normal  TM Distance: Normal  Tongue: Normal  Neck ROM: Normal ROM    Dental:  Intact, Veneers        Anesthesia Plan  Type of Anesthesia, risks & benefits discussed:    Anesthesia Type: Gen ETT  Intra-op Monitoring Plan: Standard ASA Monitors  Post Op Pain Control Plan: multimodal analgesia  Induction:  IV  Airway Plan: Video, Post-Induction  Informed Consent: Informed consent signed with the Patient and all parties understand the risks and agree with anesthesia plan.  All questions answered.   ASA Score: 3  Anesthesia Plan Notes:       Ready For Surgery From Anesthesia Perspective.     .

## 2023-10-12 ENCOUNTER — PATIENT MESSAGE (OUTPATIENT)
Dept: SURGERY | Facility: CLINIC | Age: 43
End: 2023-10-12
Payer: MEDICAID

## 2023-10-12 ENCOUNTER — TELEPHONE (OUTPATIENT)
Dept: SURGERY | Facility: CLINIC | Age: 43
End: 2023-10-12
Payer: MEDICAID

## 2023-10-12 RX ORDER — DIAZEPAM 5 MG/1
5 TABLET ORAL EVERY 8 HOURS PRN
Qty: 30 TABLET | Refills: 0 | Status: SHIPPED | OUTPATIENT
Start: 2023-10-12 | End: 2023-10-23

## 2023-10-12 RX ORDER — OXYCODONE HYDROCHLORIDE 5 MG/1
5 TABLET ORAL EVERY 4 HOURS PRN
Qty: 40 TABLET | Refills: 0 | Status: SHIPPED | OUTPATIENT
Start: 2023-10-12 | End: 2023-11-16

## 2023-10-12 NOTE — TELEPHONE ENCOUNTER
"Spoke with pt regarding medication refill. Informed pt that diazePAM (VALIUM) 5 MG tablet has been sent to [facility] pharmacy and is confirmed for pickup. Informed that pharmacy will close for 5:30 PM and not 7 PM. Advised that d/t to law and regulations, the pharmacy cannot release new request for oxyCODONE (ROXICODONE) 5 MG until 10/16. Advised to continue with warm soaks to alleviate pain in the area. Pt states, "she is hopeful that the Valium will help manage pain because she has literally been sleeping in the tub to help." Advised pt not to sleep in tub d/t use of opioids and possibility of drowning. Pt verbalized understanding. Denies further questions at this time.       ----- Message from Shaina Perez sent at 10/12/2023  4:45 PM CDT -----  Regarding: Medication concerns  Contact: [phone]  Pt is calling in wanting to see what taking so long for the pharmacy to get her diazePAM (VALIUM) 5 MG tablet and oxyCODONE (ROXICODONE) 5 MG immediate release tablet. Pt states that she is at the pharmacy and they haven't received it. Pt states the pharmacy closes at 7pm and would like this taken care of before the pharmacy closes.  Please call pt to discuss further    "

## 2023-10-12 NOTE — TELEPHONE ENCOUNTER
"Spoke with pt regarding request for additional pain medication. Pt had hemorrhoidectomy on 10/11/23 and was given 40 5 mg tabs of Oxycodone for pain management. Pt called today stating, "she needs stronger pain medication because she is taking 3 at a time and it is not touching her pain; has 5 pills remaining. Says she is taking tylenol and ibuprofen ATC in addition to warm soaks in the tub." Advised that she may not receive more pain medication as opioid medications have restrictions on how often they can be prescribed. Informed that MD will be notified.       ----- Message from Valerio Vences sent at 10/12/2023  9:36 AM CDT -----  Regarding: Medication requested  Contact: [phone]  Hi, pt called to say the pain medication is not helping her and she need something stronger. Pls call pt at [phone] to spk with her.       [id number] - [place], [place] - [address]  [address]  [facility] [address]  Phone: [phone] Fax: [phone]      "

## 2023-10-12 NOTE — PROGRESS NOTES
Spoke with Ms. Churchill regarding post operative pain.  Patient has been taking 15 mg oxycodone and has 4 pills left.  She is also taking tylenol, ibuprofen and doing warm soaks.  Med list reviewed.  Patient states that she is not taking klonopin.  DC'd prescription in MAR.  Counseled patient that she will get one medication refill and will sent one time prescription of valium for post op pain.  Emphasized importance of taking medications as prescribed.

## 2023-10-12 NOTE — TELEPHONE ENCOUNTER
Called Ms. Churchill to follow up on post-operative pain and discuss narcotic usage.  No answer.  Left voicemail.     Barbara Zaldivar MD  Staff Surgeon   Colon & Rectal Surgery

## 2023-10-16 ENCOUNTER — HOSPITAL ENCOUNTER (EMERGENCY)
Facility: OTHER | Age: 43
Discharge: HOME | End: 2023-10-16
Attending: EMERGENCY MEDICINE
Payer: MEDICAID

## 2023-10-16 ENCOUNTER — TELEPHONE (OUTPATIENT)
Dept: SURGERY | Facility: CLINIC | Age: 43
End: 2023-10-16
Payer: MEDICAID

## 2023-10-16 VITALS
TEMPERATURE: 98 F | DIASTOLIC BLOOD PRESSURE: 79 MMHG | RESPIRATION RATE: 20 BRPM | OXYGEN SATURATION: 98 % | SYSTOLIC BLOOD PRESSURE: 117 MMHG | WEIGHT: 128 LBS | BODY MASS INDEX: 21.33 KG/M2 | HEART RATE: 74 BPM | HEIGHT: 65 IN

## 2023-10-16 DIAGNOSIS — G89.18 POSTOPERATIVE PAIN: Primary | ICD-10-CM

## 2023-10-16 LAB
B-HCG UR QL: NEGATIVE
BASOPHILS # BLD AUTO: 0.05 K/UL (ref 0–0.2)
BASOPHILS NFR BLD: 1 % (ref 0–1.9)
CTP QC/QA: YES
DIFFERENTIAL METHOD: ABNORMAL
EOSINOPHIL # BLD AUTO: 0.1 K/UL (ref 0–0.5)
EOSINOPHIL NFR BLD: 1.3 % (ref 0–8)
ERYTHROCYTE [DISTWIDTH] IN BLOOD BY AUTOMATED COUNT: 15.6 % (ref 11.5–14.5)
HCT VFR BLD AUTO: 35.1 % (ref 37–48.5)
HGB BLD-MCNC: 11.4 G/DL (ref 12–16)
IMM GRANULOCYTES # BLD AUTO: 0.01 K/UL (ref 0–0.04)
IMM GRANULOCYTES NFR BLD AUTO: 0.2 % (ref 0–0.5)
LYMPHOCYTES # BLD AUTO: 1.2 K/UL (ref 1–4.8)
LYMPHOCYTES NFR BLD: 24.3 % (ref 18–48)
MCH RBC QN AUTO: 29.7 PG (ref 27–31)
MCHC RBC AUTO-ENTMCNC: 32.5 G/DL (ref 32–36)
MCV RBC AUTO: 91 FL (ref 82–98)
MONOCYTES # BLD AUTO: 0.5 K/UL (ref 0.3–1)
MONOCYTES NFR BLD: 10.1 % (ref 4–15)
NEUTROPHILS # BLD AUTO: 3 K/UL (ref 1.8–7.7)
NEUTROPHILS NFR BLD: 63.1 % (ref 38–73)
NRBC BLD-RTO: 0 /100 WBC
PLATELET # BLD AUTO: 278 K/UL (ref 150–450)
PMV BLD AUTO: 9.1 FL (ref 9.2–12.9)
RBC # BLD AUTO: 3.84 M/UL (ref 4–5.4)
WBC # BLD AUTO: 4.77 K/UL (ref 3.9–12.7)

## 2023-10-16 PROCEDURE — 99283 EMERGENCY DEPT VISIT LOW MDM: CPT

## 2023-10-16 PROCEDURE — 85025 COMPLETE CBC W/AUTO DIFF WBC: CPT | Performed by: EMERGENCY MEDICINE

## 2023-10-16 PROCEDURE — 81025 URINE PREGNANCY TEST: CPT | Performed by: EMERGENCY MEDICINE

## 2023-10-16 NOTE — ED PROVIDER NOTES
Source of History:  Patient    Chief complaint:  Rectal Bleeding (Recent hemorrhoid removal, passed scant amount of blood with BM today. )      HPI:  Shruthi Churchill is a 43 y.o. female presenting with a recent hemorrhoidectomy presents with significant rectal pain that has been going on since the surgery.  States she is not able to get additional pain medication from her general surgeon till tomorrow.  She has been able to have scant bowel movements.  Today she had about a tsp of blood and became scared.  She felt faint and called the paramedics for further evaluation.  She received fentanyl in route.    This is the extent to the patients complaints today here in the emergency department.    ROS:   See HPI.    Review of patient's allergies indicates:   Allergen Reactions    Shellfish containing products Swelling     Facial swelling       PMH:  As per HPI and below:  Past Medical History:   Diagnosis Date    Anemia     Anxiety     GI bleed     Gilbert's syndrome     Hypertension     no meds    Rectal mass      Past Surgical History:   Procedure Laterality Date    AUGMENTATION OF BREAST      COLONOSCOPY N/A 06/28/2023    Procedure: COLONOSCOPY;  Surgeon: Joey Bateman MD;  Location: [facility];  Service: Endoscopy;  Laterality: N/A;    ESOPHAGOGASTRODUODENOSCOPY N/A 06/28/2023    Procedure: EGD (ESOPHAGOGASTRODUODENOSCOPY);  Surgeon: Joey Bateman MD;  Location: [facility];  Service: Endoscopy;  Laterality: N/A;    EXCISIONAL HEMORRHOIDECTOMY N/A 10/11/2023    Procedure: HEMORRHOIDECTOMY;  Surgeon: Barbara Zaldivar MD;  Location: [facility];  Service: Colon and Rectal;  Laterality: N/A;  , excisional    OVARIAN CYST REMOVAL      ROBOTIC TAMIS (TRANSANAL MINIMALLY INVASIVE SURGERY) N/A 8/29/2023    Procedure: ROBOTIC TAMIS (TRANSANAL MINIMALLY INVASIVE SURGERY);  Surgeon: Barbara Zaldivar MD;  Location: [facility] OR;  Service: Colon and Rectal;  Laterality: N/A;  prone  need open rectal tray / 2  "HOURS       Social History     Tobacco Use    Smoking status: Never    Smokeless tobacco: Never   Substance Use Topics    Alcohol use: Not Currently     Comment: 2 bottles of wine daily (when coming off xanax March 2023)    Drug use: Not Currently     Comment: mushrooms rarely       Physical Exam:    /79 (BP Location: Left arm, Patient Position: Sitting)   Pulse 74   Temp 97.9 °F (36.6 °C) (Oral)   Resp 20   Ht 5' 5" (1.651 m)   Wt 58.1 kg (128 lb)   LMP 09/20/2023 (Approximate)   SpO2 98%   BMI 21.30 kg/m²   Nursing note and vital signs reviewed.  Constitutional: No acute distress.  Nontoxic  Cardiovascular: Regular rate and rhythm.  No murmurs. No gallops. No rubs  Respiratory:  Postop complication or infection.  Clear to auscultation bilaterally.  Good air movement.  No wheezes.  No rhonchi. No rales. No accessory muscle use.  Abdomen/:  Patient has multiple circumferential hemorrhoids.  None are thrombosed or inflamed.  No surrounding erythema.  No induration noted.  No active bleeding.  Soft, nontender and nondistended.  No guarding or rebound.  Negative Soni sign.  No McBurney point tenderness.  Non peritoneal.  Neuro: Alert. No focal deficits.  Skin: No rashes seen.     I decided to obtain the patient's medical records.  Summary of Medical Records:  Reviewed records from recent hemorrhoidectomy with Dr. SARMIENTO on 10/11/2023.  No complications noted.  Discharge notes from surgery stated that bleeding can be expected for a couple of weeks and if greater than 1 cup to seek immediate evaluation    Reviewed [place] prescription pharmacy database showing the patient ill prescriptions of 30 tablets of 5 mg diazepam on 10/13/2023 and 40 5 mg oxycodone on 10/11/2023.    MDM/ Differential Dx:   43-year-old female with rectal pain post hemorrhoidectomy.  Postop day 5.  She is no active bleeding at this time.  Will check a CBC to evaluate for anemia.  I suspect her symptoms are largely based on " constipation and pain due to recent surgery and not wanting to have a bowel movement.  I do not suspect any postop infection.    Patient has gone through a significant amount of narcotic medication going through her 7 day prescription in less than 5 days.      ED Course as of 10/16/23 1826   Mon Oct 16, 2023   1613 WBC: 4.77 [SM]   1613 Hemoglobin(!): 11.4 [SM]   1613 Platelet Count: 278 [SM]   1613 Preg Test, Ur: Negative [SM]   1636 Patient resting comfortably currently.  Vital signs stable. [SM]   1642 I spoke with Dr. Zaldivar who did the surgery.  Due to the wound still likely open topical lidocaine is not recommended.  Possibility of Anusol and maybe gabapentin.  She is currently in the hospital and will come down evaluate the patient. [SM]   1819 Dr Zaldivar came down and discussed with patient. Patient. Recommended adding topical lidocaine. Patient has rx for pain medication she can  tomorrow. [SM]   1825 Patient is still complaining of pain however she looks comfortable.  Will send prescription for topical lidocaine to the pharmacy. [SM]   1825 No further workup is indicated in the emergency department today.  I updated pt regarding results and I counseled pt regarding supportive care measures.  Diagnosis and treatment plan explained to patient. I have answered all questions. Patient discharged home in stable condition.  []      ED Course User Index  [] Holden Joy, DO               Diagnostic Impression:    1. Postoperative pain         ED Disposition Condition    Discharge Stable            ED Prescriptions       Medication Sig Dispense Start Date End Date Auth. Provider    LIDOcaine 5 % Gel Apply gel to the rectal area 1-2 times daily for pain. 10 g 10/16/2023 -- Holden Joy, DO          Follow-up Information       Follow up With Specialties Details Why Contact Info    Barbara Zaldivar MD Colon and Rectal Surgery  at your appointment [address] [place] [address]  [id number]  [phone]               Holden Joy, DO  10/16/23 1826

## 2023-10-16 NOTE — ED TRIAGE NOTES
Pt presents to the ED with c/o rectal bleeding x3 days. Pt reports hemorrhoids removed 3 days ago and passing small clots of blood with BM. Pt endorses feeling lightheaded. Denies LOC. Pt AAOx3, NAD noted.

## 2023-10-16 NOTE — TELEPHONE ENCOUNTER
Called Ms. Zhao regarding difficulty with prescription.  Her pain medication is available for  but will not be approved/covered by her insurance until tomorrow.     Barbara Zaldivar MD  Staff Surgeon   Colon & Rectal Surgery

## 2023-10-16 NOTE — CONSULTS
Surgery Consult Note      SUBJECTIVE:     Chief Complaint: anal pain     History of Present Illness:  Shruthi Churchill is a 43 y.o. female presents with post operative pain following excisional hemorrhoidectomy 10/11/23.  Spoke with patient last week and refilled medications with strict instructions regarding narcotic usage.  However, she was unable to refill due to insurance restrictions. Has been taking tylenol, ibuprofen and sitz baths as well.  Has not been taking miralax and has not had a bowel movement since surgery.  Has been urinating.  No fevers.       Review of patient's allergies indicates:   Allergen Reactions    Shellfish containing products Swelling     Facial swelling       Past Medical History:   Diagnosis Date    Anemia     Anxiety     GI bleed     Gilbert's syndrome     Hypertension     no meds    Rectal mass      Past Surgical History:   Procedure Laterality Date    AUGMENTATION OF BREAST      COLONOSCOPY N/A 06/28/2023    Procedure: COLONOSCOPY;  Surgeon: Joey Bateman MD;  Location: [facility];  Service: Endoscopy;  Laterality: N/A;    ESOPHAGOGASTRODUODENOSCOPY N/A 06/28/2023    Procedure: EGD (ESOPHAGOGASTRODUODENOSCOPY);  Surgeon: Joey Bateman MD;  Location: [facility];  Service: Endoscopy;  Laterality: N/A;    EXCISIONAL HEMORRHOIDECTOMY N/A 10/11/2023    Procedure: HEMORRHOIDECTOMY;  Surgeon: Barbara Zaldivar MD;  Location: [facility];  Service: Colon and Rectal;  Laterality: N/A;  , excisional    OVARIAN CYST REMOVAL      ROBOTIC TAMIS (TRANSANAL MINIMALLY INVASIVE SURGERY) N/A 8/29/2023    Procedure: ROBOTIC TAMIS (TRANSANAL MINIMALLY INVASIVE SURGERY);  Surgeon: Barbara Zaldivar MD;  Location: [facility];  Service: Colon and Rectal;  Laterality: N/A;  prone  need open rectal tray / 2 HOURS     No family history on file.  Social History     Tobacco Use    Smoking status: Never    Smokeless tobacco: Never   Substance Use Topics    Alcohol use: Not Currently     Comment: 2  "bottles of wine daily (when coming off xanax March 2023)    Drug use: Not Currently     Comment: mushrooms rarely        Review of Systems:  Review of Systems   All other systems reviewed and are negative.      OBJECTIVE:     Vital Signs (Most Recent)  /79 (BP Location: Left arm, Patient Position: Sitting)   Pulse 74   Temp 97.9 °F (36.6 °C) (Oral)   Resp 20   Ht 5' 5" (1.651 m)   Wt 58.1 kg (128 lb)   LMP 09/20/2023 (Approximate)   SpO2 98%   BMI 21.30 kg/m²     Physical Exam:  General: 43 y.o. female in no distress   Neuro: alert and oriented x 4.  Moves all extremities.     HEENT: normocephalic, atraumatic, PERRL, EOMI   Respiratory: respirations are even and unlabored  Cardiac: regular rate and rhythm  Abdomen: soft, NTND  Extremities: Warm dry and intact  Skin: no rashes  Anorectal: perianal swelling and residual hemorrhoid disease     Labs:   Recent Results (from the past 336 hour(s))   CBC Auto Differential    Collection Time: 10/16/23  3:35 PM   Result Value Ref Range    WBC 4.77 3.90 - 12.70 K/uL    Hemoglobin 11.4 (L) 12.0 - 16.0 g/dL    Hematocrit 35.1 (L) 37.0 - 48.5 %    Platelets 278 150 - 450 K/uL          Imaging: NA      ASSESSMENT/PLAN:       43 y.o. female with post operative pain after excisional hemorrhoidectomy    - Labs and vitals reviewed, no evidence of pelvis sepsis  - Continue current pain regimen, patient can refill pain medication tomorrow.    - okay for topical lidocaine (can use prescription or OTC recticare 5% lidocaine)   - Miralax BID until having daily soft bowel movements     Barbara Zaldivar MD  Staff Surgeon  Colon & Rectal Surgery    "

## 2023-10-17 ENCOUNTER — HOSPITAL ENCOUNTER (EMERGENCY)
Facility: OTHER | Age: 43
Discharge: HOME | End: 2023-10-17
Attending: EMERGENCY MEDICINE
Payer: MEDICAID

## 2023-10-17 VITALS
OXYGEN SATURATION: 100 % | BODY MASS INDEX: 21.85 KG/M2 | SYSTOLIC BLOOD PRESSURE: 115 MMHG | HEART RATE: 67 BPM | DIASTOLIC BLOOD PRESSURE: 75 MMHG | TEMPERATURE: 98 F | HEIGHT: 64 IN | WEIGHT: 128 LBS | RESPIRATION RATE: 16 BRPM

## 2023-10-17 DIAGNOSIS — G89.18 POST-OPERATIVE PAIN: ICD-10-CM

## 2023-10-17 DIAGNOSIS — K59.00 CONSTIPATION: Primary | ICD-10-CM

## 2023-10-17 DIAGNOSIS — K62.89 RECTAL PAIN: ICD-10-CM

## 2023-10-17 LAB
B-HCG UR QL: NEGATIVE
CTP QC/QA: YES

## 2023-10-17 PROCEDURE — 99284 EMERGENCY DEPT VISIT MOD MDM: CPT

## 2023-10-17 PROCEDURE — 25000003 PHARM REV CODE 250

## 2023-10-17 PROCEDURE — 81025 URINE PREGNANCY TEST: CPT

## 2023-10-17 RX ORDER — LIDOCAINE AND PRILOCAINE 25; 25 MG/G; MG/G
CREAM TOPICAL
Status: COMPLETED | OUTPATIENT
Start: 2023-10-17 | End: 2023-10-17

## 2023-10-17 RX ORDER — DOCUSATE SODIUM 100 MG/1
100 CAPSULE, LIQUID FILLED ORAL ONCE
Status: COMPLETED | OUTPATIENT
Start: 2023-10-17 | End: 2023-10-17

## 2023-10-17 RX ADMIN — LIDOCAINE AND PRILOCAINE: 25; 25 CREAM TOPICAL at 09:10

## 2023-10-17 RX ADMIN — DOCUSATE SODIUM 100 MG: 100 CAPSULE, LIQUID FILLED ORAL at 09:10

## 2023-10-17 RX ADMIN — SODIUM CHLORIDE 1000 ML: 0.9 INJECTION, SOLUTION INTRAVENOUS at 09:10

## 2023-10-17 NOTE — ED TRIAGE NOTES
C/O rectal pain and bleeding with constipation; hemorrhoidectomy 10/11; denies relief with oxycodone 5mg, ibuprofen, Tylenol, and topical lidocaine. Reports ~tsp of blood noted after passing small, hard BM this am. Denies lightheadedness, fever/chills, ABD pain. Seen in ED yesterday for same c/c.

## 2023-10-17 NOTE — ED PROVIDER NOTES
Encounter Date: 10/17/2023       History     Chief Complaint   Patient presents with    Rectal Bleeding     Rectal bleeding and pain since having hemorrhoid removed 5 days ago.      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  She has been following with colorectal surgeon Dr. Zaldivar and had insurance delays and picking up her medications.  Has continued to have some spotting of her rectum.  Had a partial bowel movement today which was extremely painful.  Only started taking stool softeners 2 days ago.  Denies fever, chills, chest pain, shortness of breath, N/V.        Review of patient's allergies indicates:   Allergen Reactions    Shellfish containing products Swelling     Facial swelling     Past Medical History:   Diagnosis Date    Anemia     Anxiety     GI bleed     Gilbert's syndrome     Hypertension     no meds    Rectal mass      Past Surgical History:   Procedure Laterality Date    AUGMENTATION OF BREAST      COLONOSCOPY N/A 06/28/2023    Procedure: COLONOSCOPY;  Surgeon: Joey Bateman MD;  Location: [facility];  Service: Endoscopy;  Laterality: N/A;    ESOPHAGOGASTRODUODENOSCOPY N/A 06/28/2023    Procedure: EGD (ESOPHAGOGASTRODUODENOSCOPY);  Surgeon: Joey Bateman MD;  Location: [facility];  Service: Endoscopy;  Laterality: N/A;    EXCISIONAL HEMORRHOIDECTOMY N/A 10/11/2023    Procedure: HEMORRHOIDECTOMY;  Surgeon: Barbara Zaldivar MD;  [facility];  Service: Colon and Rectal;  Laterality: N/A;  , excisional    OVARIAN CYST REMOVAL      ROBOTIC TAMIS (TRANSANAL MINIMALLY INVASIVE SURGERY) N/A 8/29/2023    Procedure: ROBOTIC TAMIS (TRANSANAL MINIMALLY INVASIVE SURGERY);  Surgeon: Barbara Zaldivar MD;  [facility];  Service: Colon and Rectal;  Laterality: N/A;   prone  need open rectal tray / 2 HOURS     History reviewed. No pertinent family history.  Social History     Tobacco Use    Smoking status: Never    Smokeless tobacco: Never   Substance Use Topics    Alcohol use: Not Currently     Comment: 2 bottles of wine daily (when coming off xanax March 2023)    Drug use: Not Currently     Comment: mushrooms rarely     Review of Systems  As per HPI above  Physical Exam     Initial Vitals [10/17/23 0820]   BP Pulse Resp Temp SpO2   132/83 82 16 97.6 °F (36.4 °C) 98 %      MAP       --         Physical Exam    Constitutional: She appears well-developed and well-nourished.  Non-toxic appearance. She does not appear ill. No distress.   HENT:   Head: Normocephalic and atraumatic.   Eyes: Conjunctivae are normal.   Neck: Neck supple.   Cardiovascular:  Normal rate and regular rhythm.           Pulmonary/Chest: Effort normal. No tachypnea. No respiratory distress.   Abdominal: Abdomen is soft and flat. She exhibits no distension. There is generalized abdominal tenderness. There is no rebound and no guarding.   Genitourinary:    Genitourinary Comments: External rectal exam performed with female chaperone at bedside.  Multiple non-thrombosed circumferential hemorrhoids. No surrounding erythema. No induration noted. No active bleeding.     Musculoskeletal:      Cervical back: Neck supple.     Neurological: She is alert and oriented to person, place, and time.   Skin: Skin is warm, dry and intact.   Psychiatric: She has a normal mood and affect. Her speech is normal and behavior is normal.         ED Course   Procedures  Labs Reviewed   POCT URINE PREGNANCY          Imaging Results              X-Ray Abdomen Flat And Erect (Final result)  Result time 10/17/23 09:35:45      Final result by Kami Paiz MD (10/17/23 09:35:45)                   Impression:      No significant stool burden.  Scattered air-fluid levels in small and large bowel loops which can be seen with laxative use  or gastroenteritis..      Electronically signed by: Kami Paiz  Date:    10/17/2023  Time:    09:35               Narrative:    EXAMINATION:  XR ABDOMEN FLAT AND ERECT    CLINICAL HISTORY:  Constipation, unspecified    TECHNIQUE:  Flat and erect AP views of the abdomen were performed.    COMPARISON:  None    FINDINGS:  Scattered air-fluid levels in small bowel loops, and to a lesser degree the colon.  No free intraperitoneal air on upright views.  No suspicious calcifications.  Phleboliths in the pelvis.    Lung bases are clear.                                       Medications   sodium chloride 0.9% bolus 1,000 mL 1,000 mL (1,000 mLs Intravenous New Bag 10/17/23 0901)   LIDOcaine-prilocaine cream ( Topical (Top) Given 10/17/23 0905)   docusate sodium capsule 100 mg (100 mg Oral Given 10/17/23 0905)     Medical Decision Making  Urgent evaluation of a 43-year-old female with persistent rectal pain s/p hemorrhoidectomy 10/11.  Patient seen yesterday in the ED for same complaints and followed up with surgeon Dr. Zaldivar who sent in additional prescriptions for analgesia.  There was a delay in filling her pain medication due to her insurance.  She has prescriptions for oxycodone and Valium arrived by her surgeon available for  in the pharmacy now.  Will obtain KUB to rule out obstruction or impaction. Patient requesting IV fluids as she feels dehydrated, will give IV fluids to help with hydration to facilitate bowel movement in addition to stool softener. Will have pharmacy deliver patient's meds to bedside. No additional analgesia will be prescribed by the ED today.  Basic labs repeated less than 24 hours ago, do not feel need to be repeated at this time as overall presentation unchanged.  Patient to follow up with Dr. Zaldivar    Differential diagnosis includes but is not limited to:  Postoperative pain, hemorrhoids, rectal pain, constipation, fecal impaction, ileus      Amount and/or Complexity of Data  Reviewed  External Data Reviewed: notes.  Labs: ordered.     Details: UPT negative  Radiology: ordered.     Details: No significant stool burden.  Scattered air-fluid levels in small and large bowel loops which can be seen with laxative use or gastroenteritis..     Risk  OTC drugs.  Prescription drug management.                               Clinical Impression:   Final diagnoses:  [K59.00] Constipation (Primary)  [G89.18] Post-operative pain  [K62.89] Rectal pain        ED Disposition Condition    Discharge Stable          ED Prescriptions       Medication Sig Dispense Start Date End Date Auth. Provider    LIDOcaine 5 % Gel  (Status: Discontinued) Apply gel to the rectal area 1-2 times daily for pain. 10 g 10/17/2023 10/17/2023 Denae Whelan PA-C    LIDOcaine 5 % Gel Apply gel to the rectal area 1-2 times daily for pain. 10 g 10/17/2023 -- Denae Whelan PA-C          Follow-up Information       Follow up With Specialties Details Why Contact Info    [facility] Emergency Dept Emergency Medicine  If symptoms worsen [address] [facility] [address] [phone]             Denae Whelan PA-C  10/17/23 1857

## 2023-10-18 LAB
FINAL PATHOLOGIC DIAGNOSIS: NORMAL
Lab: NORMAL

## 2023-10-25 ENCOUNTER — TELEPHONE (OUTPATIENT)
Dept: SURGERY | Facility: CLINIC | Age: 43
End: 2023-10-25
Payer: MEDICAID

## 2023-11-08 ENCOUNTER — TELEPHONE (OUTPATIENT)
Dept: SURGERY | Facility: CLINIC | Age: 43
End: 2023-11-08
Payer: MEDICAID

## 2023-11-10 ENCOUNTER — TELEPHONE (OUTPATIENT)
Dept: SURGERY | Facility: CLINIC | Age: 43
End: 2023-11-10
Payer: MEDICAID

## 2023-11-10 NOTE — TELEPHONE ENCOUNTER
"Spoke with pt regarding request to rescheduled PO appt from to a later date. Pt states, "she is cramping and does not have transportation to appt." Offered pt to be seen on 11/17 for 3:40 PM in the [facility]. Pt verbally confirmed new appt date and time. Denies further questions at this time.       ----- Message from Kj Aburto sent at 11/10/2023 12:23 PM CST -----  Type:  Patient Returning Call    Who Called:pt  Who Left Message for Patient:  Does the patient know what this is regarding?:reschedule appt  Would the patient rather a call back or a response via [facility]? Call  Best Call Back Number:[phone]  Additional Information: pt states she would like a call to reschedule appt to Monday if possible       "

## 2023-11-16 ENCOUNTER — TELEPHONE (OUTPATIENT)
Dept: PRIMARY CARE CLINIC | Facility: CLINIC | Age: 43
End: 2023-11-16
Payer: MEDICAID

## 2023-11-16 ENCOUNTER — ON-DEMAND VIRTUAL (OUTPATIENT)
Dept: URGENT CARE | Facility: CLINIC | Age: 43
End: 2023-11-16
Payer: MEDICAID

## 2023-11-16 DIAGNOSIS — F41.9 ANXIETY: Primary | ICD-10-CM

## 2023-11-16 PROCEDURE — 99202 OFFICE O/P NEW SF 15 MIN: CPT | Mod: 95,,, | Performed by: NURSE PRACTITIONER

## 2023-11-16 PROCEDURE — 99202 PR OFFICE/OUTPT VISIT, NEW, LEVL II, 15-29 MIN: ICD-10-PCS | Mod: 95,,, | Performed by: NURSE PRACTITIONER

## 2023-11-16 NOTE — TELEPHONE ENCOUNTER
----- Message from Cindi Au sent at 11/16/2023  1:19 PM CST -----  Regarding: pt called  Name of Who is Calling: LANETTE NICK [[id number]]      What is the request in detail: pt is requesting to establish care. Suffers with anxiety that cause her to throw up and needs to be seen. Please advise       Can the clinic reply by [facility]: No       What Number to Call Back if not in [facility]: Telephone Information:           [phone]

## 2023-11-16 NOTE — PATIENT INSTRUCTIONS
Anxiety Discharge Instructions, Adult   About this topic   Anxiety can cause you to feel very worried. It can also cause physical symptoms like chest pain, stomach aches, or trouble sleeping. While mild anxiety is a normal response to stress, it can cause you problems in your everyday life. You may need follow-up care to help manage your anxiety. Anxiety happens in many forms, like:  Being scared all the time that something bad is going to happen. This is generalized anxiety.  Strong bursts of fear where your body has signs that may feel like a heart attack. This is called a panic attack.  Upsetting thoughts that happen often. There is a need to repeat doing certain things to help get rid of the anxiety caused by these thoughts. The thoughts or actions may be about checking on things, touching things, or worry about germs. This is an obsessive-compulsive disorder.  Strong fear of an object, place, or condition. This is a phobia.  Fear that others think bad things about you or being put down by other people. This is social anxiety.  Nightmares, flashbacks, staying away from people, or having panic attacks when reminded of a shocking or hurtful time or place from the past. This is post-traumatic stress.  Anxiety disorder may be treated in many ways. Some kinds of treatment have you talk about your beliefs, fears, and worries. You may learn how certain thoughts or feelings can raise anxiety. You may also learn what steps to take to lower anxiety. Other kinds of treatment may have you look back on a hurtful event, sad memory, or something you are afraid of. The doctor will help you deal with the feelings that you may have. You may learn ways to cope with unwanted events or thoughts by looking at your fears in a way that feels safe.  What care is needed at home?   Ask your doctor what you need to do when you go home. Make sure you ask questions if you do not understand what the doctor says.  Set a time to talk with a  counselor about your worries and feelings. This can help you with your anxiety.  Take care to follow all instructions when you take your medicines.  Limit alcohol and caffeine.  Learn ways to manage stress. Relaxation methods like reflection, deep breathing, and muscle relaxation may be helpful. Things like yoga, exercise, and sherrie chi are also good.  Talk about your feelings with family members and friends you trust. Talk to someone who can help you see how your thoughts at certain times may raise your anxiety.     What follow-up care is needed?   Your doctor may ask you to make visits to the office to check on your progress. Be sure to keep these visits.  What drugs may be needed?   The doctor may order drugs to help the physical signs of anxiety. Make sure that you take the drugs as taught to you by the doctor. Talk with your doctor about any side effects and ask how long you should take the drug.  Will physical activity be limited?   You may take part in physical activities. Some people are limited because of their anxiety or fear. Talk with your doctor about the right amount of activity for you.  What changes to diet are needed?   Eat a variety of healthy foods and limit drinks with caffeine. You should avoid alcohol, energy drinks, and over-the-counter stimulants.  What problems could happen?   If your anxiety is not treated, it can result in:  Staying away from work or social events  Not being able to do everyday tasks  Keeping away from family and friends  What can be done to prevent this health problem?   Learn what events, people, or things upset you. Limit your contact with them.  Talk about your feelings. Talk to someone who can help you see how your thoughts at certain times may raise your anxiety.  Seek support from your friends and family. Find someone who calms you down. Ask if you can call them when you are getting anxious.  When do I need to call the doctor?   You feel you may harm yourself or  someone else.  You can also call a mental health hotline for help.  You have any physical symptoms, such as chest pain, trouble breathing, or severe belly pain, that could be a sign of a serious problem.  If you are short of breath.  If you do not feel like you can be alone.  Teach Back: Helping You Understand   The Teach Back Method helps you understand the information we are giving you. After you talk with the staff, tell them in your own words what you learned. This helps to make sure the staff has described each thing clearly. It also helps to explain things that may have been confusing. Before going home, make sure you can do these:  I can tell you about my condition and the drugs I need to take.  I can tell you what may help lower my anxiety.  I can tell you what I will do if it is hard to breathe or I have chest pain.  I can tell you what I will do if I do not feel safe or cannot be alone.  Where can I learn more?   [facility]  https://www.tramaine.org/Learn-More/Mental-Health-Conditions/Anxiety-Disorders   National Health Service  https://www.nhs.uk/conditions/generalised-anxiety-disorder/symptoms/   [employer] [place] of [facility] ? [facility]  https://www.paul.nih.gov/health/relieving-stress-anxiety-xjhuunxeu-suqkzgntzl-qfjbvovvyh   [employer] [place] of Mental Health  http://www.nimh.nih.gov/health/publications/anxiety-disorders/complete-index.shtml   Last Reviewed Date   2021-06-08  Consumer Information Use and Disclaimer   This information is not specific medical advice and does not replace information you receive from your health care provider. This is only a brief summary of general information. It does NOT include all information about conditions, illnesses, injuries, tests, procedures, treatments, therapies, discharge instructions or life-style choices that may apply to you. You must talk with your health care provider for complete information about your health and treatment options. This information should  not be used to decide whether or not to accept your health care providers advice, instructions or recommendations. Only your health care provider has the knowledge and training to provide advice that is right for you.  Copyright   Copyright © 2021 UpToDate, Inc. and its affiliates and/or licensors. All rights reserved.

## 2023-11-17 ENCOUNTER — OFFICE VISIT (OUTPATIENT)
Dept: SURGERY | Facility: CLINIC | Age: 43
End: 2023-11-17
Payer: MEDICAID

## 2023-11-17 ENCOUNTER — TELEPHONE (OUTPATIENT)
Dept: SURGERY | Facility: CLINIC | Age: 43
End: 2023-11-17
Payer: MEDICAID

## 2023-11-17 VITALS
RESPIRATION RATE: 19 BRPM | WEIGHT: 129.88 LBS | HEART RATE: 81 BPM | BODY MASS INDEX: 22.17 KG/M2 | OXYGEN SATURATION: 95 % | DIASTOLIC BLOOD PRESSURE: 83 MMHG | HEIGHT: 64 IN | SYSTOLIC BLOOD PRESSURE: 117 MMHG

## 2023-11-17 DIAGNOSIS — K64.3 GRADE IV HEMORRHOIDS: Primary | ICD-10-CM

## 2023-11-17 PROCEDURE — 3074F PR MOST RECENT SYSTOLIC BLOOD PRESSURE < 130 MM HG: ICD-10-PCS | Mod: CPTII,,, | Performed by: SURGERY

## 2023-11-17 PROCEDURE — 1159F PR MEDICATION LIST DOCUMENTED IN MEDICAL RECORD: ICD-10-PCS | Mod: CPTII,,, | Performed by: SURGERY

## 2023-11-17 PROCEDURE — 3074F SYST BP LT 130 MM HG: CPT | Mod: CPTII,,, | Performed by: SURGERY

## 2023-11-17 PROCEDURE — 99024 POSTOP FOLLOW-UP VISIT: CPT | Mod: ,,, | Performed by: SURGERY

## 2023-11-17 PROCEDURE — 99999 PR PBB SHADOW E&M-EST. PATIENT-LVL III: CPT | Mod: PBBFAC,,, | Performed by: SURGERY

## 2023-11-17 PROCEDURE — 99999 PR PBB SHADOW E&M-EST. PATIENT-LVL III: ICD-10-PCS | Mod: PBBFAC,,, | Performed by: SURGERY

## 2023-11-17 PROCEDURE — 1159F MED LIST DOCD IN RCRD: CPT | Mod: CPTII,,, | Performed by: SURGERY

## 2023-11-17 PROCEDURE — 3079F PR MOST RECENT DIASTOLIC BLOOD PRESSURE 80-89 MM HG: ICD-10-PCS | Mod: CPTII,,, | Performed by: SURGERY

## 2023-11-17 PROCEDURE — 99024 PR POST-OP FOLLOW-UP VISIT: ICD-10-PCS | Mod: ,,, | Performed by: SURGERY

## 2023-11-17 PROCEDURE — 99213 OFFICE O/P EST LOW 20 MIN: CPT | Mod: PBBFAC | Performed by: SURGERY

## 2023-11-17 PROCEDURE — 3079F DIAST BP 80-89 MM HG: CPT | Mod: CPTII,,, | Performed by: SURGERY

## 2023-11-17 RX ORDER — HYDROCORTISONE 25 MG/G
CREAM TOPICAL 2 TIMES DAILY
Qty: 28 G | Refills: 2 | Status: SHIPPED | OUTPATIENT
Start: 2023-11-17

## 2023-11-17 NOTE — PROGRESS NOTES
Colon & Rectal Surgery Clinic Follow Up    HPI:   Shruthi Churchill is a 43 y.o. female who presents for follow up after excisional hemorrhoidectomy on 10/11/23.      Pain improved with improved hydration and stool softeners.  Now having daily bowel movement.  Still some pain with bowel movements, one more tender skin tag.       Objective:   Vitals:    11/17/23 1509   BP: 117/83   Pulse: 81   Resp: 19        Physical Exam   Gen: well developed female, NAD  HEENT: normocephalic, atraumatic, PERRL, EOMI   CV: RRR, no murmurs  Resp: nonlabored, CTAB   Abd: soft, NTND  MSK: no gross deformities, no cyanosis or edema   Anorectal: residual hemorrhoid skin tags, mild inflammation and induration in the RAL skin tag    Assessment and Plan:   Shruthi Churchill  is a 43 y.o. female who presents for follow up after excisional hemorrhoidectomy    - continue bowel regimen, adequate hydration, avoidance of constipation   - anusol BID x 10 days for inflamed skin tag  - follow up Feb/Mar for flex sig for surveillance of rectal polyp       Barbara Zaldivar MD  Staff Surgeon   Colon & Rectal Surgery

## 2023-11-20 ENCOUNTER — OFFICE VISIT (OUTPATIENT)
Dept: PRIMARY CARE CLINIC | Facility: CLINIC | Age: 43
End: 2023-11-20
Payer: MEDICAID

## 2023-11-20 VITALS
HEART RATE: 76 BPM | SYSTOLIC BLOOD PRESSURE: 117 MMHG | BODY MASS INDEX: 22.47 KG/M2 | HEIGHT: 64 IN | WEIGHT: 131.63 LBS | DIASTOLIC BLOOD PRESSURE: 77 MMHG

## 2023-11-20 DIAGNOSIS — Z13.220 LIPID SCREENING: Primary | ICD-10-CM

## 2023-11-20 DIAGNOSIS — F41.0 GENERALIZED ANXIETY DISORDER WITH PANIC ATTACKS: ICD-10-CM

## 2023-11-20 DIAGNOSIS — Z12.4 CERVICAL CANCER SCREENING: ICD-10-CM

## 2023-11-20 DIAGNOSIS — Z12.31 ENCOUNTER FOR SCREENING MAMMOGRAM FOR MALIGNANT NEOPLASM OF BREAST: ICD-10-CM

## 2023-11-20 DIAGNOSIS — Z13.1 DIABETES MELLITUS SCREENING: ICD-10-CM

## 2023-11-20 DIAGNOSIS — Z11.4 ENCOUNTER FOR SCREENING FOR HIV: ICD-10-CM

## 2023-11-20 DIAGNOSIS — Z00.00 ROUTINE HEALTH MAINTENANCE: ICD-10-CM

## 2023-11-20 DIAGNOSIS — F41.1 GENERALIZED ANXIETY DISORDER WITH PANIC ATTACKS: ICD-10-CM

## 2023-11-20 DIAGNOSIS — Z11.59 ENCOUNTER FOR HEPATITIS C SCREENING TEST FOR LOW RISK PATIENT: ICD-10-CM

## 2023-11-20 PROCEDURE — 99386 PR PREVENTIVE VISIT,NEW,40-64: ICD-10-PCS | Mod: S$PBB,,, | Performed by: STUDENT IN AN ORGANIZED HEALTH CARE EDUCATION/TRAINING PROGRAM

## 2023-11-20 PROCEDURE — 99213 OFFICE O/P EST LOW 20 MIN: CPT | Mod: PBBFAC,PN | Performed by: STUDENT IN AN ORGANIZED HEALTH CARE EDUCATION/TRAINING PROGRAM

## 2023-11-20 PROCEDURE — 1159F MED LIST DOCD IN RCRD: CPT | Mod: CPTII,,, | Performed by: STUDENT IN AN ORGANIZED HEALTH CARE EDUCATION/TRAINING PROGRAM

## 2023-11-20 PROCEDURE — 1159F PR MEDICATION LIST DOCUMENTED IN MEDICAL RECORD: ICD-10-PCS | Mod: CPTII,,, | Performed by: STUDENT IN AN ORGANIZED HEALTH CARE EDUCATION/TRAINING PROGRAM

## 2023-11-20 PROCEDURE — 99999 PR PBB SHADOW E&M-EST. PATIENT-LVL III: ICD-10-PCS | Mod: PBBFAC,,, | Performed by: STUDENT IN AN ORGANIZED HEALTH CARE EDUCATION/TRAINING PROGRAM

## 2023-11-20 PROCEDURE — 3078F DIAST BP <80 MM HG: CPT | Mod: CPTII,,, | Performed by: STUDENT IN AN ORGANIZED HEALTH CARE EDUCATION/TRAINING PROGRAM

## 2023-11-20 PROCEDURE — 3078F PR MOST RECENT DIASTOLIC BLOOD PRESSURE < 80 MM HG: ICD-10-PCS | Mod: CPTII,,, | Performed by: STUDENT IN AN ORGANIZED HEALTH CARE EDUCATION/TRAINING PROGRAM

## 2023-11-20 PROCEDURE — 3008F PR BODY MASS INDEX (BMI) DOCUMENTED: ICD-10-PCS | Mod: CPTII,,, | Performed by: STUDENT IN AN ORGANIZED HEALTH CARE EDUCATION/TRAINING PROGRAM

## 2023-11-20 PROCEDURE — 3008F BODY MASS INDEX DOCD: CPT | Mod: CPTII,,, | Performed by: STUDENT IN AN ORGANIZED HEALTH CARE EDUCATION/TRAINING PROGRAM

## 2023-11-20 PROCEDURE — 3074F PR MOST RECENT SYSTOLIC BLOOD PRESSURE < 130 MM HG: ICD-10-PCS | Mod: CPTII,,, | Performed by: STUDENT IN AN ORGANIZED HEALTH CARE EDUCATION/TRAINING PROGRAM

## 2023-11-20 PROCEDURE — 99386 PREV VISIT NEW AGE 40-64: CPT | Mod: S$PBB,,, | Performed by: STUDENT IN AN ORGANIZED HEALTH CARE EDUCATION/TRAINING PROGRAM

## 2023-11-20 PROCEDURE — 99999 PR PBB SHADOW E&M-EST. PATIENT-LVL III: CPT | Mod: PBBFAC,,, | Performed by: STUDENT IN AN ORGANIZED HEALTH CARE EDUCATION/TRAINING PROGRAM

## 2023-11-20 PROCEDURE — 3074F SYST BP LT 130 MM HG: CPT | Mod: CPTII,,, | Performed by: STUDENT IN AN ORGANIZED HEALTH CARE EDUCATION/TRAINING PROGRAM

## 2023-11-20 RX ORDER — HYDROXYZINE HYDROCHLORIDE 25 MG/1
25 TABLET, FILM COATED ORAL
COMMUNITY
Start: 2023-08-10

## 2023-11-20 RX ORDER — CLONAZEPAM 0.5 MG/1
0.5 TABLET ORAL DAILY PRN
Qty: 7 TABLET | Refills: 0 | Status: SHIPPED | OUTPATIENT
Start: 2023-11-20

## 2023-11-20 NOTE — PROGRESS NOTES
11/20/2023    Shruthi Churchill  [id number]    CC: Establish as a patient    HPI:     43 y.o. female, Shruthi Churchill, new to this clinic presents here to establish as a patient. PMH  YUE with hx of klonopin long term, but none recently    YUE  Moved here 8 months ago and was not been able to get her medication. Been dealing with anxiety her whole life and has not been able to sleep.  Panic attacks have been worse and spinning at time. States that this is taking away from the quality of her life. Also complains being exhausted. She is getting poor sleep due to waking through the night with heart racing and sweaty. She also is eating less b/c it feels like there is a knot in her stomach and having nausea.   Hx of SSRI   Negative association with prozac  Hx of xanax but didn't like   Hx of klonopin working well for her; states that a dose would last 2 days   Hx of following with therapy or psychiatry  Exercise: goes to the gym and rides bike routinely and makes her feel better    GERD  Has prescription she needs to         Review of Systems:     Review of Systems   Constitutional:  Negative for chills and fever.   HENT:  Negative for congestion, rhinorrhea and sore throat.    Respiratory:  Negative for chest tightness, shortness of breath and wheezing.    Cardiovascular:  Negative for chest pain and palpitations.   Gastrointestinal:  Negative for diarrhea, nausea and vomiting.   Psychiatric/Behavioral:  Positive for sleep disturbance. The patient is nervous/anxious.         Objective:     Vitals:    11/20/23 1308   BP: 117/77   Pulse: 76       Past Medical History:   Diagnosis Date    Anemia     Anxiety     GI bleed     Gilbert's syndrome     Hypertension     no meds    Rectal mass        Past Surgical History:   Procedure Laterality Date    AUGMENTATION OF BREAST      COLONOSCOPY N/A 06/28/2023    Procedure: COLONOSCOPY;  Surgeon: Joey Bateman MD;  Location: [facility];  Service: Endoscopy;   Laterality: N/A;    ESOPHAGOGASTRODUODENOSCOPY N/A 06/28/2023    Procedure: EGD (ESOPHAGOGASTRODUODENOSCOPY);  Surgeon: Joey Bateman MD;  Location: [facility] ENDO;  Service: Endoscopy;  Laterality: N/A;    EXCISIONAL HEMORRHOIDECTOMY N/A 10/11/2023    Procedure: HEMORRHOIDECTOMY;  Surgeon: Barbara Zaldivar MD;  Location: [facility] OR;  Service: Colon and Rectal;  Laterality: N/A;  , excisional    OVARIAN CYST REMOVAL      ROBOTIC TAMIS (TRANSANAL MINIMALLY INVASIVE SURGERY) N/A 8/29/2023    Procedure: ROBOTIC TAMIS (TRANSANAL MINIMALLY INVASIVE SURGERY);  Surgeon: Barbara Zaldivar MD;  Location: [facility] OR;  Service: Colon and Rectal;  Laterality: N/A;  prone  need open rectal tray / 2 HOURS       No family history on file.    Social History     Socioeconomic History    Marital status: Single   Tobacco Use    Smoking status: Never    Smokeless tobacco: Never   Substance and Sexual Activity    Alcohol use: Not Currently     Comment: 2 bottles of wine daily (when coming off xanax March 2023)    Drug use: Not Currently     Comment: mushrooms rarely     Social Determinants of Health     Financial Resource Strain: High Risk (11/16/2023)    Overall Financial Resource Strain (CARDIA)     Difficulty of Paying Living Expenses: Very hard   Food Insecurity: Food Insecurity Present (11/16/2023)    Hunger Vital Sign     Worried About Running Out of Food in the Last Year: Often true     Ran Out of Food in the Last Year: Often true   Transportation Needs: Unmet Transportation Needs (11/16/2023)    PRAPARE - Transportation     Lack of Transportation (Medical): Yes     Lack of Transportation (Non-Medical): Yes   Physical Activity: Unknown (11/16/2023)    Exercise Vital Sign     Days of Exercise per Week: 4 days   Stress: Stress Concern Present (11/16/2023)    [place] [employer] - Occupational Stress Questionnaire     Feeling of Stress : Very much   Social Connections: Unknown (11/16/2023)    Social Connection and  Isolation Panel [NHANES]     Frequency of Communication with Friends and Family: Patient refused     Frequency of Social Gatherings with Friends and Family: Patient refused     Attends [employer] Services: Never     Active Member of Clubs or Organizations: No     Attends Club or Organization Meetings: Never     Marital Status:    Housing Stability: High Risk (11/16/2023)    Housing Stability Vital Sign     Unable to Pay for Housing in the Last Year: Yes     Number of Places Lived in the Last Year: 4     Unstable Housing in the Last Year: Yes         Current Outpatient Medications:     polycarbophil (FIBERCON) 625 mg tablet, Take 2 tabs up to 3 times a day, Disp: 120 tablet, Rfl: 3    clonazePAM (KLONOPIN) 0.5 MG tablet, Take 1 tablet (0.5 mg total) by mouth daily as needed for Anxiety., Disp: 7 tablet, Rfl: 0    diazePAM (VALIUM) 5 MG tablet, Take 1 tablet (5 mg total) by mouth every 8 (eight) hours as needed (prn rectal spasm)., Disp: 30 tablet, Rfl: 0    hydrocortisone (ANUSOL-HC) 2.5 % rectal cream, Place rectally 2 (two) times daily. (Patient not taking: Reported on 11/20/2023), Disp: 28 g, Rfl: 2    hydrOXYzine HCL (ATARAX) 25 MG tablet, Take 25 mg by mouth., Disp: , Rfl:   No current facility-administered medications for this visit.    Facility-Administered Medications Ordered in Other Visits:     0.9%  NaCl infusion, , Intravenous, Continuous, Yenni Matthew NP    LIDOcaine (PF) 10 mg/ml (1%) injection 10 mg, 1 mL, Intradermal, Once, Yenni Matthew NP    mupirocin 2 % ointment, , Nasal, On Call Procedure, Yenni Matthew NP, Given at 10/11/23 1025    Physical Exam  Constitutional:       General: She is not in acute distress.     Appearance: Normal appearance. She is normal weight. She is not ill-appearing or toxic-appearing.   HENT:      Head: Normocephalic and atraumatic.      Right Ear: External ear normal.      Left Ear: External ear normal.      Nose: Nose normal. No congestion or  rhinorrhea.      Mouth/Throat:      Mouth: Mucous membranes are moist.      Pharynx: Oropharynx is clear. No oropharyngeal exudate or posterior oropharyngeal erythema.   Eyes:      General:         Right eye: No discharge.         Left eye: No discharge.      Conjunctiva/sclera: Conjunctivae normal.   Cardiovascular:      Rate and Rhythm: Normal rate and regular rhythm.      Pulses: Normal pulses.      Heart sounds: Normal heart sounds. No murmur heard.  Pulmonary:      Effort: Pulmonary effort is normal.      Breath sounds: Normal breath sounds. No wheezing, rhonchi or rales.   Abdominal:      General: Bowel sounds are normal.   Musculoskeletal:      Cervical back: Normal range of motion.   Skin:     General: Skin is warm and dry.      Capillary Refill: Capillary refill takes less than 2 seconds.   Neurological:      General: No focal deficit present.      Mental Status: She is alert.         Assessment:     43 y.o. female Diagnoses and all orders for this visit:    Lipid screening  None on file  -     Lipid Panel; Future    Cervical cancer screening  -     Ambulatory referral/consult to Gynecology; Future    Encounter for screening mammogram for malignant neoplasm of breast  -     Mammo Digital Screening Bilat; Future    Encounter for screening for HIV  -     HIV 1/2 Ag/Ab (4th Gen); Future    Encounter for hepatitis C screening test for low risk patient  -     HEPATITIS C ANTIBODY; Future    Diabetes mellitus screening  -     Hemoglobin A1C; Future    Routine health maintenance  -     TSH; Future    Generalized anxiety disorder with panic attacks  Patient not agreeable to standard practices; discussed this clinic does not provided chronic BZD prescriptions  -     Ambulatory referral/consult to Psychiatry; Future  -     clonazePAM (KLONOPIN) 0.5 MG tablet; Take 1 tablet (0.5 mg total) by mouth daily as needed for Anxiety.   No refills     Nabil GALARZA    I hereby acknowledge that I am relying upon  documentation authored by a medical student working under my supervision and further I hereby attest that I have verified the student documentation or findings by personally performing the physical exam and medical decision making activities of the Evaluation and Management service to be billed.    Gricelda Saavedra MD    RTC as needed

## 2024-01-18 ENCOUNTER — OFFICE VISIT (OUTPATIENT)
Dept: OBSTETRICS AND GYNECOLOGY | Facility: CLINIC | Age: 44
End: 2024-01-18
Payer: MEDICAID

## 2024-01-18 ENCOUNTER — TELEPHONE (OUTPATIENT)
Dept: OBSTETRICS AND GYNECOLOGY | Facility: CLINIC | Age: 44
End: 2024-01-18

## 2024-01-18 VITALS — SYSTOLIC BLOOD PRESSURE: 114 MMHG | DIASTOLIC BLOOD PRESSURE: 76 MMHG | WEIGHT: 134.5 LBS | BODY MASS INDEX: 23.08 KG/M2

## 2024-01-18 DIAGNOSIS — Z12.4 CERVICAL CANCER SCREENING: ICD-10-CM

## 2024-01-18 DIAGNOSIS — Z01.419 WELL WOMAN EXAM WITH ROUTINE GYNECOLOGICAL EXAM: Primary | ICD-10-CM

## 2024-01-18 PROCEDURE — 99213 OFFICE O/P EST LOW 20 MIN: CPT | Mod: PBBFAC,PO | Performed by: OBSTETRICS & GYNECOLOGY

## 2024-01-18 PROCEDURE — 3074F SYST BP LT 130 MM HG: CPT | Mod: CPTII,,, | Performed by: OBSTETRICS & GYNECOLOGY

## 2024-01-18 PROCEDURE — 99386 PREV VISIT NEW AGE 40-64: CPT | Mod: S$PBB,,, | Performed by: OBSTETRICS & GYNECOLOGY

## 2024-01-18 PROCEDURE — 1159F MED LIST DOCD IN RCRD: CPT | Mod: CPTII,,, | Performed by: OBSTETRICS & GYNECOLOGY

## 2024-01-18 PROCEDURE — 3078F DIAST BP <80 MM HG: CPT | Mod: CPTII,,, | Performed by: OBSTETRICS & GYNECOLOGY

## 2024-01-18 PROCEDURE — 1160F RVW MEDS BY RX/DR IN RCRD: CPT | Mod: CPTII,,, | Performed by: OBSTETRICS & GYNECOLOGY

## 2024-01-18 PROCEDURE — 3008F BODY MASS INDEX DOCD: CPT | Mod: CPTII,,, | Performed by: OBSTETRICS & GYNECOLOGY

## 2024-01-18 PROCEDURE — 88175 CYTOPATH C/V AUTO FLUID REDO: CPT | Performed by: OBSTETRICS & GYNECOLOGY

## 2024-01-18 PROCEDURE — 99999 PR PBB SHADOW E&M-EST. PATIENT-LVL III: CPT | Mod: PBBFAC,,, | Performed by: OBSTETRICS & GYNECOLOGY

## 2024-01-18 NOTE — PROGRESS NOTES
HPI:   43 y.o.   OB History    No obstetric history on file.      Patient's last menstrual period was 01/04/2024.    Patient is  here for her annual gynecologic exam.  She has no complaints.     ROS:  GENERAL: No fever, chills, fatigability or weight loss.  SKIN: No rashes, itching or changes in color or texture of skin.  HEAD: No headaches or recent head trauma.  EYES: Visual acuity fine. No photophobia, ocular pain or diplopia.  EARS: Denies ear pain, discharge or vertigo.  NOSE: No loss of smell, no epistaxis or postnasal drip.  MOUTH & THROAT: No hoarseness or change in voice. No excessive gum bleeding.  NODES: Denies swollen glands.  CHEST: Denies VELAZQUEZ, cyanosis, wheezing, cough and sputum production.  CARDIOVASCULAR: Denies chest pain, PND, orthopnea or reduced exercise tolerance.  ABDOMEN: Appetite fine. No weight loss. Denies diarrhea, abdominal pain, hematemesis or blood in stool.  URINARY: No flank pain, dysuria or hematuria.  PERIPHERAL VASCULAR: No claudication or cyanosis.  MUSCULOSKELETAL: No joint stiffness or swelling. Denies back pain.  NEUROLOGIC: No history of seizures, paralysis, alteration of gait or coordination.    PE:   /76   Wt 61 kg (134 lb 7.7 oz)   LMP 01/04/2024   BMI 23.08 kg/m²   APPEARANCE: Well nourished, well developed, in no acute distress.  NECK: Neck symmetric without masses or thyromegaly.  BREASTS: Symmetrical, no skin changes or visible lesions. No palpable masses, nipple discharge or adenopathy bilaterally.  ABDOMEN: Flat. Soft. No tenderness or masses. No hepatosplenomegaly. No hernias. No CVA tenderness.  VULVA: No lesions. Normal female genitalia.  URETHRAL MEATUS: Normal size and location, no lesions, no prolapse.  URETHRA: No masses, tenderness, prolapse or scarring.  VAGINA: Moist and well rugated, no discharge, no significant cystocele or rectocele.  CERVIX: No lesions and discharge. PAP done.  UTERUS: Normal size, regular shape, mobile, non-tender, bladder  base nontender.  ADNEXA: No masses, tenderness or CDS nodularity.  ANUS PERINEUM: Normal.    PROCEDURES:  Pap smear    Assessment:  Normal Gynecologic Exam    Plan:  Mammogram and Colonoscopy if indicated by current recommendations.  Return to clinic in one year or for any problems or complaints.  Reg cycles  Hx ov cyst removed years ago

## 2024-01-18 NOTE — TELEPHONE ENCOUNTER
----- Message from Nallely Benedict sent at 1/18/2024  2:45 PM CST -----  Contact: pt  Type:  Patient Returning Call    Who Called:pt   Who Left Message for Patient:Dr. Wiedemann   Does the patient know what this is regarding?:no   Would the patient rather a call back or a response via [facility]? Call   Best Call Back Number:[phone]  Additional Information:

## 2024-01-23 LAB
FINAL PATHOLOGIC DIAGNOSIS: NORMAL
Lab: NORMAL

## 2024-03-01 ENCOUNTER — TELEPHONE (OUTPATIENT)
Dept: SURGERY | Facility: CLINIC | Age: 44
End: 2024-03-01
Payer: MEDICAID

## 2024-03-01 NOTE — TELEPHONE ENCOUNTER
Spoke with pt regarding rescheduling of missed appt on 2/26. Informed that she can be r/s to 3/28 at 1020 at [facility] floor clinic. Pt verbally confirmed time and location. Requests information be sent to her portal. Advised that she will be able to see appt details in pt portal. Pt denies further questions at this time.       ----- Message from Toya Carrera sent at 3/1/2024 12:47 PM CST -----  Same Day Appointment Request     Caller Is Requesting A Same Day Appointment      Caller Declined First Available Appointment? I SCHEDULED HER AND ADDER TO WAIT LIST. PT STILL WANTED A MESSAGE SENT FOR SOMETHING SOONER.     Best Call Back Number? [phone]    Additional Information:  THANK YOU       Thank You

## 2024-03-08 ENCOUNTER — TELEPHONE (OUTPATIENT)
Dept: SURGERY | Facility: CLINIC | Age: 44
End: 2024-03-08
Payer: MEDICAID

## 2024-03-08 NOTE — TELEPHONE ENCOUNTER
Attempted to contact pt regarding appt on 3/28. No answer. Left message stating she will be scheduled on 3/28 at 2 PM in the [facility]. CRS number provided to return call.

## 2024-03-08 NOTE — TELEPHONE ENCOUNTER
Attempted to contact pt regarding appt on 3/28 with Dr. Zaldivar. No answer. Left voicemail requesting a call back for rescheduling. CRS number provided. Message sent on pt portal.

## 2024-03-27 ENCOUNTER — TELEPHONE (OUTPATIENT)
Dept: SURGERY | Facility: HOSPITAL | Age: 44
End: 2024-03-27
Payer: MEDICAID

## 2024-03-28 ENCOUNTER — OFFICE VISIT (OUTPATIENT)
Dept: SURGERY | Facility: CLINIC | Age: 44
End: 2024-03-28
Payer: MEDICAID

## 2024-03-28 VITALS
SYSTOLIC BLOOD PRESSURE: 123 MMHG | WEIGHT: 134.06 LBS | DIASTOLIC BLOOD PRESSURE: 75 MMHG | HEART RATE: 64 BPM | BODY MASS INDEX: 22.89 KG/M2 | HEIGHT: 64 IN

## 2024-03-28 DIAGNOSIS — D49.0: Primary | ICD-10-CM

## 2024-03-28 PROCEDURE — 1159F MED LIST DOCD IN RCRD: CPT | Mod: CPTII,,, | Performed by: SURGERY

## 2024-03-28 PROCEDURE — 88305 TISSUE EXAM BY PATHOLOGIST: CPT | Mod: 26,,, | Performed by: STUDENT IN AN ORGANIZED HEALTH CARE EDUCATION/TRAINING PROGRAM

## 2024-03-28 PROCEDURE — 88305 TISSUE EXAM BY PATHOLOGIST: CPT | Performed by: STUDENT IN AN ORGANIZED HEALTH CARE EDUCATION/TRAINING PROGRAM

## 2024-03-28 PROCEDURE — 3008F BODY MASS INDEX DOCD: CPT | Mod: CPTII,,, | Performed by: SURGERY

## 2024-03-28 PROCEDURE — 45331 SIGMOIDOSCOPY AND BIOPSY: CPT | Mod: S$PBB,,, | Performed by: SURGERY

## 2024-03-28 PROCEDURE — 99213 OFFICE O/P EST LOW 20 MIN: CPT | Mod: PBBFAC,25 | Performed by: SURGERY

## 2024-03-28 PROCEDURE — 3078F DIAST BP <80 MM HG: CPT | Mod: CPTII,,, | Performed by: SURGERY

## 2024-03-28 PROCEDURE — 3074F SYST BP LT 130 MM HG: CPT | Mod: CPTII,,, | Performed by: SURGERY

## 2024-03-28 PROCEDURE — 99213 OFFICE O/P EST LOW 20 MIN: CPT | Mod: S$PBB,25,, | Performed by: SURGERY

## 2024-03-28 PROCEDURE — 99999 PR PBB SHADOW E&M-EST. PATIENT-LVL III: CPT | Mod: PBBFAC,,, | Performed by: SURGERY

## 2024-03-28 NOTE — [employer]ATION PATIENT INSTRUCTIONS
Discharge Summary/Instructions after an Endoscopic Procedure  Patient Name: Shruthi Churchill  Patient MRN: [id number]  Patient YOB: 1980  Thursday, March 28, 2024  Barbara Zaldivar MD  Dear patient,  As a result of recent federal legislation (The Federal Cures Act), you may   receive lab or pathology results from your procedure in your [facility]   account before your physician is able to contact you. Your physician or   their representative will relay the results to you with their   recommendations at their soonest availability.  Thank you,  RESTRICTIONS:  During your procedure today, you received medications for sedation.  These   medications may affect your judgment, balance and coordination.  Therefore,   for 24 hours, you have the following restrictions:   - DO NOT drive a car, operate machinery, make legal/financial decisions,   sign important papers or drink alcohol.    ACTIVITY:  Today: no heavy lifting, straining or running due to procedural   sedation/anesthesia.  The following day: return to full activity including work.  DIET:  Eat and drink normally unless instructed otherwise.     TREATMENT FOR COMMON SIDE EFFECTS:  - Mild abdominal pain, nausea, belching, bloating or excessive gas:  rest,   eat lightly and use a heating pad.  - Sore Throat: treat with throat lozenges and/or gargle with warm salt   water.  - Because air was used during the procedure, expelling large amounts of air   from your rectum or belching is normal.  - If a bowel prep was taken, you may not have a bowel movement for 1-3 days.    This is normal.  SYMPTOMS TO WATCH FOR AND REPORT TO YOUR PHYSICIAN:  1. Abdominal pain or bloating, other than gas cramps.  2. Chest pain.  3. Back pain.  4. Signs of infection such as: chills or fever occurring within 24 hours   after the procedure.  5. Rectal bleeding, which would show as bright red, maroon, or black stools.   (A tablespoon of blood from the rectum is not serious, especially if    hemorrhoids are present.)  6. Vomiting.  7. Weakness or dizziness.  GO DIRECTLY TO THE NEAREST EMERGENCY ROOM IF YOU HAVE ANY OF THE FOLLOWING:      Difficulty breathing              Chills and/or fever over 101 F   Persistent vomiting and/or vomiting blood   Severe abdominal pain   Severe chest pain   Black, tarry stools   Bleeding- more than one tablespoon   Any other symptom or condition that you feel may need urgent attention  Your doctor recommends these additional instructions:  If any biopsies were taken, your doctors clinic will contact you in 1 to 2   weeks with any results.  - Discharge patient to home (ambulatory).  For questions, problems or results please call your physician - Barbara Zaldivar MD at Work:  [phone].  [facility], EMERGENCY ROOM PHONE NUMBER: [phone]  IF A COMPLICATION OR EMERGENCY SITUATION ARISES AND YOU ARE UNABLE TO REACH   YOUR PHYSICIAN - GO DIRECTLY TO THE EMERGENCY ROOM.  MD Barbara Tarango MD  3/28/2024 3:23:23 PM  This report has been verified and signed electronically.  Dear patient,  As a result of recent federal legislation (The Federal Cures Act), you may   receive lab or pathology results from your procedure in your [facility]   account before your physician is able to contact you. Your physician or   their representative will relay the results to you with their   recommendations at their soonest availability.  Thank you,  [employer]ATION

## 2024-03-28 NOTE — PROGRESS NOTES
Colon & Rectal Surgery Clinic Follow Up    HPI:   Shruthi Churchill is a 43 y.o. female who presents for flexible sigmoidoscopy follow up after TAMIS 8/29/23 for 9 cm rectal polyp.       Pathology:  Rectum, polyp, transanal excision: Tubulovillous adenoma. Margins of excision appear free of dysplasia in well-oriented planes of section     Interval History 3/28/24: patient presents to clinic for follow-up and 6 month flex sig surveillance. She reports continued trouble with bowel movements. She is still taking the fiber, she ran out of stool softeners. She's been feeling tired lately and has started taking iron pills. Denies any bloody bowel movements.    Objective:   Vitals:    03/28/24 1430   BP: 123/75   Pulse: 64          Physical Exam   Gen: well developed female, NAD  HEENT: normocephalic, atraumatic   CV: RRR, no murmurs  Resp: nonlabored, CTAB   Abd: soft, NTND  MSK: no gross deformities, no cyanosis or edema   Anorectal: residual hemorrhoid skin tags, SANTOS with high resting anal sphincter tone, no stenosis    See provation for flex sig     Assessment and Plan:   Shruthi Churchill  is a 43 y.o. female who presents for 6 month flex sig follow-up after TAMIS 8/29/23.     - Flexible sigmoidoscopy in clinic today with scar, biopsied due to prominent mucosal pattern   - Patient with ongoing constipation.  Will trial linzess given failure of miralax  - Patient with severe fatigue, she reports concern for occult malignancy and requests PET CT.  We discussed that I do not have an indication to order a PET CT however, I would recommend general laboratory evaluation by PCP.  Patient given name recommendations for primary care providers.   - follow up in 3 months     Smooth Kee MD  [facility]     Barbara Zaldivar MD  Staff Surgeon   Colon & Rectal Surgery

## 2024-04-03 LAB
FINAL PATHOLOGIC DIAGNOSIS: NORMAL
GROSS: NORMAL
Lab: NORMAL

## 2024-06-18 DIAGNOSIS — Z98.82 BREAST IMPLANT STATUS: Primary | ICD-10-CM

## 2024-06-26 ENCOUNTER — TELEPHONE (OUTPATIENT)
Dept: SURGERY | Facility: CLINIC | Age: 44
End: 2024-06-26
Payer: MEDICAID

## 2024-07-04 PROBLEM — F10.939 ALCOHOL WITHDRAWAL SEIZURE: Status: ACTIVE | Noted: 2024-07-04

## 2024-07-04 PROBLEM — R07.9 CHEST PAIN: Status: ACTIVE | Noted: 2024-07-04

## 2024-07-04 PROBLEM — R56.9 ALCOHOL WITHDRAWAL SEIZURE: Status: ACTIVE | Noted: 2024-07-04

## 2024-07-05 ENCOUNTER — TELEPHONE (OUTPATIENT)
Dept: GASTROENTEROLOGY | Facility: CLINIC | Age: 44
End: 2024-07-05
Payer: MEDICAID

## 2024-07-05 PROBLEM — R07.9 CHEST PAIN: Status: RESOLVED | Noted: 2024-07-04 | Resolved: 2024-07-05

## 2024-07-05 NOTE — TELEPHONE ENCOUNTER
Pt schedule to see GI in [facility] Nothing available in [facility].    108

## 2024-07-05 NOTE — TELEPHONE ENCOUNTER
----- Message from Glenny Sierra sent at 7/5/2024  2:20 PM CDT -----  Type:   Appointment Request       Name of Caller: Michelle   When is the first available appointment?n/a   Symptoms:hospital f/u   Best Call Back Number:[phone]  Additional Information: pt is discharging today from [facility]

## 2024-07-08 ENCOUNTER — TELEPHONE (OUTPATIENT)
Dept: OBSTETRICS AND GYNECOLOGY | Facility: CLINIC | Age: 44
End: 2024-07-08

## 2024-07-08 NOTE — TELEPHONE ENCOUNTER
----- Message from Glenny Sierra sent at 7/5/2024  2:27 PM CDT -----  Type:  Same Day Appointment Request    Caller is requesting a same day appointment.  Caller declined first available appointment listed below.    Name of Caller:Michelle /    When is the first available appointment?N/A   Symptoms:hospital f/u   Best Call Back Number [phone]  Additional Information: uterine fibroids--- pt is discharging today from [facility]

## 2024-07-12 ENCOUNTER — OFFICE VISIT (OUTPATIENT)
Dept: PRIMARY CARE CLINIC | Facility: CLINIC | Age: 44
End: 2024-07-12
Payer: MEDICAID

## 2024-07-12 ENCOUNTER — HOSPITAL ENCOUNTER (OUTPATIENT)
Dept: RADIOLOGY | Facility: OTHER | Age: 44
Discharge: HOME | End: 2024-07-12
Attending: FAMILY MEDICINE
Payer: MEDICAID

## 2024-07-12 VITALS
BODY MASS INDEX: 21.81 KG/M2 | RESPIRATION RATE: 16 BRPM | HEART RATE: 77 BPM | OXYGEN SATURATION: 100 % | SYSTOLIC BLOOD PRESSURE: 104 MMHG | DIASTOLIC BLOOD PRESSURE: 66 MMHG | HEIGHT: 66 IN | WEIGHT: 135.69 LBS

## 2024-07-12 DIAGNOSIS — F41.1 GENERALIZED ANXIETY DISORDER WITH PANIC ATTACKS: ICD-10-CM

## 2024-07-12 DIAGNOSIS — F10.930 ALCOHOL WITHDRAWAL SEIZURE WITHOUT COMPLICATION: ICD-10-CM

## 2024-07-12 DIAGNOSIS — K57.92 DIVERTICULITIS: ICD-10-CM

## 2024-07-12 DIAGNOSIS — Z98.82 BREAST IMPLANT STATUS: ICD-10-CM

## 2024-07-12 DIAGNOSIS — Z13.31 POSITIVE SCREENING FOR DEPRESSION ON 9-ITEM PATIENT HEALTH QUESTIONNAIRE (PHQ-9): ICD-10-CM

## 2024-07-12 DIAGNOSIS — E80.4 GILBERT'S SYNDROME: ICD-10-CM

## 2024-07-12 DIAGNOSIS — Z09 HOSPITAL DISCHARGE FOLLOW-UP: Primary | ICD-10-CM

## 2024-07-12 DIAGNOSIS — R56.9 ALCOHOL WITHDRAWAL SEIZURE WITHOUT COMPLICATION: ICD-10-CM

## 2024-07-12 DIAGNOSIS — F41.0 GENERALIZED ANXIETY DISORDER WITH PANIC ATTACKS: ICD-10-CM

## 2024-07-12 DIAGNOSIS — F10.10 ALCOHOL ABUSE: ICD-10-CM

## 2024-07-12 DIAGNOSIS — Z12.31 VISIT FOR SCREENING MAMMOGRAM: ICD-10-CM

## 2024-07-12 PROCEDURE — 3078F DIAST BP <80 MM HG: CPT | Mod: CPTII,,,

## 2024-07-12 PROCEDURE — 77063 BREAST TOMOSYNTHESIS BI: CPT | Mod: TC

## 2024-07-12 PROCEDURE — 99215 OFFICE O/P EST HI 40 MIN: CPT | Mod: PBBFAC,PN

## 2024-07-12 PROCEDURE — 3074F SYST BP LT 130 MM HG: CPT | Mod: CPTII,,,

## 2024-07-12 PROCEDURE — 1160F RVW MEDS BY RX/DR IN RCRD: CPT | Mod: CPTII,,,

## 2024-07-12 PROCEDURE — 77063 BREAST TOMOSYNTHESIS BI: CPT | Mod: 26,,, | Performed by: RADIOLOGY

## 2024-07-12 PROCEDURE — 1159F MED LIST DOCD IN RCRD: CPT | Mod: CPTII,,,

## 2024-07-12 PROCEDURE — 99999 PR PBB SHADOW E&M-EST. PATIENT-LVL V: CPT | Mod: PBBFAC,,,

## 2024-07-12 PROCEDURE — 3008F BODY MASS INDEX DOCD: CPT | Mod: CPTII,,,

## 2024-07-12 PROCEDURE — 77067 SCR MAMMO BI INCL CAD: CPT | Mod: TC

## 2024-07-12 PROCEDURE — 77067 SCR MAMMO BI INCL CAD: CPT | Mod: 26,,, | Performed by: RADIOLOGY

## 2024-07-12 PROCEDURE — 99215 OFFICE O/P EST HI 40 MIN: CPT | Mod: S$PBB,,,

## 2024-07-12 NOTE — PROGRESS NOTES
"Subjective     Patient ID: Shruthi Churchill is a 44 y.o. female.    Chief Complaint: Establish Care      Shruthi Churchill is a 44 year old female, presents to clinic for hospital discharge follow-up and to establish care.  New to me. No PCP. Medical and surgical history, in addition to problem list reviewed and listed below.      Discharge Information    Discharge Date:  07/05/2024          Primary Discharge Diagnosis:  Alcohol withdrawal seizure          How patient is feeling since discharge from the hospital?  "Much better." States has "little bit sharp pain "(pointed to RLQ of abdomen). "Don't know if gas but been constant."    Medication & Order Review    Discharge Medication Review:    Medication reconciliation performed Yes  If no, state reason why not performed: N/A      Did patient have any difficulty/problems filling prescriptions?  No           If yes, state reason and steps taken to assist in resolving issue: N/A     Does patient have any questions regarding medications?  No.  Will call for thiamine.           If yes, state question and steps taken to answer question:  N/A     Was Home Health and/or any equipment ordered for patient upon discharge?  No          Home Health No  If yes, has home health contacted patient and/or initiated services? N/A  Name of Home Health Agency N/A  Durable Medical Equipment (DME)  No (Example: walker, wheelchair, nebulizer)  If yes, has the DME provider contacted patient and delivered equipment? N/A   DME Company N/A    Red Flag Review    Was patient educated on "red flags" or things to watch for?  Yes       If yes:  "Red flags" patient was told to watch for:    ·  "If any jitters or anxiety, call your doctor"              ·  "Dizziness, jitters"              ·  "Have others on paperwork"               Other:              Is patient experiencing any red flags today (or any of these symptoms) (List examples of red flags specifically)?  Yes       Notes:  Patient states " "has anxiety                    If no: N/A    Educated the patient on 3 "red flags" to watch for: N/A   ·                ·                ·                 Other:                  Is patient experiencing any red flags today (or any of these symptoms) (List examples of red flags specifically)?       Notes:        Patient Education & Follow Up              Phone number patient will call if having any questions or problems:  Patient states has phone number available on paperwork    Appointment scheduled?  Yes      Follow-up/transition of care appointment, including the date/time and location of your appointment:  Patient was able to state the follow-up appointment correctly.        Notes: States was reminded on the [facility]         Provided patient with date, time and location of follow-up appointment if they do not have it:  N/A      Rescheduled transition of care appointment if the patient has a conflict:    Appointment re-scheduled?  N/A        Patient informed of this appointment?        Notes: Patient have Ob/Gyn and Gastroenterology referrals    3 questions patient would like to ask physician during appointment:  ·  Prescription for Adderall        ·          ·        Patient with recent admission to hospital on 07/04/2024. Reports she tried to wean her self off alcohol.   Patient states she has ADHD and PTSD from childhood trauma.  Also states suffers with depression and anxiety; seen by Psychiatry in 2010, 2012 and 2020. 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  States inability to focus on one task due to so much responsibility.  Requesting Adderall; states last taken [id number].  Not taking trazodone at present because doesn't like how make her feel.    Health maintenance - Had mammogram today. States had pap smear done four months ago.      Past Medical History:   Diagnosis Date    Anemia     Anxiety     GI bleed     Gilbert's syndrome     " Hypertension     no meds    Rectal mass      Past Surgical History:   Procedure Laterality Date    AUGMENTATION OF BREAST      COLONOSCOPY N/A 06/28/2023    Procedure: COLONOSCOPY;  Surgeon: Joey Bateman MD;  Location: [facility]note 3[facility] ENDO;  Service: Endoscopy;  Laterality: N/A;    ESOPHAGOGASTRODUODENOSCOPY N/A 06/28/2023    Procedure: EGD (ESOPHAGOGASTRODUODENOSCOPY);  Surgeon: Joey Bateman MD;  Location: [facility]note 3] ENDO;  Service: Endoscopy;  Laterality: N/A;    EXCISIONAL HEMORRHOIDECTOMY N/A 10/11/2023    Procedure: HEMORRHOIDECTOMY;  Surgeon: Barbara Zaldivar MD;  Location: [facility] OR;  Service: Colon and Rectal;  Laterality: N/A;  , excisional    OVARIAN CYST REMOVAL      ROBOTIC TAMIS (TRANSANAL MINIMALLY INVASIVE SURGERY) N/A 8/29/2023    Procedure: ROBOTIC TAMIS (TRANSANAL MINIMALLY INVASIVE SURGERY);  Surgeon: Barbara Zaldivar MD;  Location: [facility] OR;  Service: Colon and Rectal;  Laterality: N/A;  prone  need open rectal tray / 2 HOURS     Social History     Socioeconomic History    Marital status: Single   Tobacco Use    Smoking status: Never     Passive exposure: Never    Smokeless tobacco: Never   Substance and Sexual Activity    Alcohol use: Not Currently     Comment: 2 bottles of wine daily (when coming off xanax March 2023)    Drug use: Not Currently     Comment: mushrooms rarely    Sexual activity: Yes     Partners: Male     Social Determinants of Health     Financial Resource Strain: High Risk (7/5/2024)    Overall Financial Resource Strain (CARDIA)     Difficulty of Paying Living Expenses: Very hard   Food Insecurity: Food Insecurity Present (7/5/2024)    Hunger Vital Sign     Worried About Running Out of Food in the Last Year: Often true     Ran Out of Food in the Last Year: Often true   Transportation Needs: No Transportation Needs (7/5/2024)    TRANSPORTATION NEEDS     Transportation : No   Physical Activity: Sufficiently Active (7/5/2024)    Exercise Vital Sign     Days of Exercise per Week: 3  days     Minutes of Exercise per Session: 50 min   Stress: Stress Concern Present (7/5/2024)    [employer] [place] [employer] - Occupational Stress Questionnaire     Feeling of Stress : Very much   Housing Stability: High Risk (7/5/2024)    Housing Stability Vital Sign     Unable to Pay for Housing in the Last Year: Yes     Homeless in the Last Year: No     Review of patient's allergies indicates:   Allergen Reactions    Shellfish containing products Swelling     Facial swelling     Patient Active Problem List   Diagnosis    Alcohol abuse    Tumor of rectum    Grade IV hemorrhoids    Alcohol withdrawal seizure    Gilbert's syndrome     Review of Systems   Constitutional:  Positive for fatigue. Negative for chills, diaphoresis and fever.   HENT:  Negative for ear pain, hearing loss, nosebleeds, tinnitus and trouble swallowing. Dental problem: Teeth grinding.   Eyes:  Positive for visual disturbance (Eye straining: had eye exam and is waiting on glasses). Negative for photophobia, pain and discharge.   Respiratory:  Negative for chest tightness and shortness of breath.    Cardiovascular:  Negative for chest pain, palpitations and leg swelling.   Gastrointestinal:  Positive for constipation, diarrhea, nausea and vomiting. Negative for abdominal pain and reflux.        IBS   Endocrine: Negative for polydipsia, polyphagia and polyuria.   Genitourinary:  Negative for difficulty urinating, flank pain, menstrual irregularity and pelvic pain.   Musculoskeletal:  Negative for back pain, gait problem, joint swelling, leg pain, myalgias, neck pain, neck stiffness and joint deformity.        Scoliosis   Integumentary:  Negative for rash.   Allergic/Immunologic: Positive for food allergies.   Neurological:  Positive for headaches (States bad every now and then). Negative for dizziness, vertigo, tremors, seizures, syncope, speech difficulty and numbness.   Psychiatric/Behavioral:  Positive for dysphoric mood and sleep  "disturbance (States always had issues with sleep since was little). Negative for self-injury and suicidal ideas.         Objective   Vitals:    07/12/24 1525   BP: 104/66   BP Location: Right arm   Patient Position: Sitting   BP Method: Medium (Automatic)   Pulse: 77   Resp: 16   SpO2: 100%   Weight: 61.5 kg (135 lb 11.1 oz)   Height: 5' 6" (1.676 m)       Physical Exam  Constitutional:       General: She is not in acute distress.     Appearance: Normal appearance.   HENT:      Head: Normocephalic and atraumatic.      Right Ear: Tympanic membrane, ear canal and external ear normal.      Left Ear: Tympanic membrane, ear canal and external ear normal.      Nose: Nose normal.      Mouth/Throat:      Mouth: Mucous membranes are moist.      Pharynx: Oropharynx is clear. No oropharyngeal exudate or posterior oropharyngeal erythema.   Eyes:      Extraocular Movements: Extraocular movements intact.      Conjunctiva/sclera: Conjunctivae normal.      Pupils: Pupils are equal, round, and reactive to light.   Cardiovascular:      Rate and Rhythm: Normal rate and regular rhythm.      Pulses: Normal pulses.      Heart sounds: Normal heart sounds.   Pulmonary:      Effort: Pulmonary effort is normal. No respiratory distress.      Breath sounds: Normal breath sounds.   Abdominal:      General: Bowel sounds are normal.      Palpations: Abdomen is soft.      Tenderness: There is abdominal tenderness in the right lower quadrant.   Musculoskeletal:         General: Normal range of motion.      Cervical back: Normal range of motion and neck supple.      Right lower leg: No edema.      Left lower leg: No edema.   Skin:     General: Skin is warm and dry.      Capillary Refill: Capillary refill takes less than 2 seconds.      Findings: No rash.   Neurological:      Mental Status: She is alert and oriented to person, place, and time.   Psychiatric:         Attention and Perception: She does not perceive auditory or visual hallucinations.    "      Mood and Affect: Affect is tearful (intermittently with calmness when explanation for need for an Adderall prescription).         Speech: Speech normal.         Behavior: Behavior normal. Behavior is cooperative.         Thought Content: Thought content does not include homicidal (denies) or suicidal (denies) ideation.      Comments: PHQ-9: 19          Assessment and Plan     1. Hospital discharge follow-up    2. Alcohol withdrawal seizure without complication    3. Alcohol abuse        -   Continue lithium, thiamine and folic acid as prescribed.      4. Diverticulitis        -    Has Gastroenterology referral    5. Gilbert's syndrome        -    Has Gastroenterology referral.    6. Positive screening for depression on 9-item Patient Health Questionnaire (PHQ-9)  -     Ambulatory referral/consult to Behavioral Health; Future; Expected date: 07/19/2024    7. Generalized anxiety disorder with panic attacks  -     Ambulatory referral/consult to Behavioral Health; Future; Expected date: 07/19/2024       Follow up in about 2 months (around 9/12/2024).    I spent a total of 40 minutes on the day of the visit.This includes face to face time and non-face to face time preparing to see the patient (eg, review of tests), obtaining and/or reviewing separately obtained history, documenting clinical information in the electronic or other health record, independently interpreting results and communicating results to the patient/family/caregiver, or care coordinator.     Lori A. Stephens Sandifer, FNP-C

## 2024-07-16 ENCOUNTER — DOCUMENTATION ONLY (OUTPATIENT)
Dept: HEMATOLOGY/ONCOLOGY | Facility: CLINIC | Age: 44
End: 2024-07-16
Payer: MEDICAID

## 2024-07-19 ENCOUNTER — TELEPHONE (OUTPATIENT)
Dept: SURGERY | Facility: CLINIC | Age: 44
End: 2024-07-19
Payer: MEDICAID

## 2024-07-22 ENCOUNTER — TELEPHONE (OUTPATIENT)
Dept: SURGERY | Facility: CLINIC | Age: 44
End: 2024-07-22
Payer: MEDICAID

## 2024-07-22 NOTE — TELEPHONE ENCOUNTER
"Attempted to contact pt regarding rescheduling appt. No answer. Left voicemail requesting call back or response to portal message. CRS number provided.       ----- Message from Pari Crespo sent at 7/22/2024  9:25 AM CDT -----  Regarding: appt access  Contact: [phone]  .Appt Date:7/22/24      Type of appt: NFL; F/u     Physician: Dr Zaldivar      Reason for rescheduling? Appt date no longer works;       Caller: Self       Contact Preference: [phone]     Additional Information:  "Thank you for all that you do for our patients"  "

## 2024-08-08 RX ORDER — LINACLOTIDE 72 UG/1
CAPSULE, GELATIN COATED ORAL
Qty: 30 CAPSULE | Refills: 3 | OUTPATIENT
Start: 2024-08-08

## 2024-09-13 ENCOUNTER — TELEPHONE (OUTPATIENT)
Dept: SURGERY | Facility: CLINIC | Age: 44
End: 2024-09-13
Payer: MEDICAID

## 2024-09-13 NOTE — TELEPHONE ENCOUNTER
Spoke with pt regarding follow up visit with Dr. Zaldivar for surveillance. Offered pt to be seen on 10/7 at 0940 at [facility] location. Pt verbally confirmed appt time and location. Denies further questions at this time.         ----- Message from Duke Gamboa sent at 9/13/2024  3:50 PM CDT -----  Good afternoon,     The pt listed above is being referred from Jerry Maurciio for (History of colon cancer). I have scanned the referral and records into . The patient is established with Dr. Zaldivar. Please advise or contact pt to schedule appt at your earliest convenience.     Thank You,     Duke Gamboa  [facility]

## 2024-10-07 ENCOUNTER — OFFICE VISIT (OUTPATIENT)
Dept: SURGERY | Facility: CLINIC | Age: 44
End: 2024-10-07
Payer: MEDICAID

## 2024-10-07 VITALS
DIASTOLIC BLOOD PRESSURE: 79 MMHG | SYSTOLIC BLOOD PRESSURE: 121 MMHG | HEART RATE: 73 BPM | WEIGHT: 127.63 LBS | BODY MASS INDEX: 20.51 KG/M2 | HEIGHT: 66 IN

## 2024-10-07 DIAGNOSIS — Z87.19 HX OF CONSTIPATION: Primary | ICD-10-CM

## 2024-10-07 DIAGNOSIS — D49.0: Primary | ICD-10-CM

## 2024-10-07 PROCEDURE — 99213 OFFICE O/P EST LOW 20 MIN: CPT | Mod: PBBFAC | Performed by: SURGERY

## 2024-10-07 NOTE — PROGRESS NOTES
Colon & Rectal Surgery Clinic Follow Up    HPI:   Shruthi Churchill is a 44 y.o. female who presents for flexible sigmoidoscopy follow up after TAMIS 8/29/23 for 9 cm rectal polyp.         Pathology:  Rectum, polyp, transanal excision: Tubulovillous adenoma. Margins of excision appear free of dysplasia in well-oriented planes of section      Interval History 3/28/24: patient presents to clinic for follow-up and 6 month flex sig surveillance. She reports continued trouble with bowel movements. She is still taking the fiber, she ran out of stool softeners. She's been feeling tired lately and has started taking iron pills. Denies any bloody bowel movements.    Interval history:   Was not able to fill linzess so reports constipation and hemorrhoids.  Not drinking enough water or taking fiber.        Objective:   Vitals:    10/07/24 1017   BP: 121/79   Pulse: 73        Physical Exam   Gen: well developed female, NAD  HEENT: normocephalic, atraumatic   CV: RRR, no murmurs  Resp: nonlabored, CTAB   Abd: soft, NTND  MSK: no gross deformities, no cyanosis or edema   Anorectal: residual hemorrhoid skin tags, SANTOS with high resting anal sphincter tone, no stenosis    Assessment and Plan:   Shruthi Churchill  is a 44 y.o. female who presents for follow up of rectal polyp and hemorrhoids    - will check status of linzess as refill was sent within last few months   - increase hydration and resume fiber  - flex sig performed in clinic with healthy appearing rectal scar without evidence of recurrent polyp  - will plan for repeat colonoscopy 3 years from initial in 2023.        Barbara Zaldivar MD  Staff Surgeon   Colon & Rectal Surgery

## 2024-10-07 NOTE — PROVATION PATIENT INSTRUCTIONS
Discharge Summary/Instructions after an Endoscopic Procedure  Patient Name: Shruthi Churchill  Patient MRN: [id number]  Patient YOB: 1980  Monday, October 7, 2024  Barbara Zaldivar MD  Dear patient,  As a result of recent federal legislation (The Federal Cures Act), you may   receive lab or pathology results from your procedure in your [facility]   account before your physician is able to contact you. Your physician or   their representative will relay the results to you with their   recommendations at their soonest availability.  Thank you,  RESTRICTIONS:  During your procedure today, you received medications for sedation.  These   medications may affect your judgment, balance and coordination.  Therefore,   for 24 hours, you have the following restrictions:   - DO NOT drive a car, operate machinery, make legal/financial decisions,   sign important papers or drink alcohol.    ACTIVITY:  Today: no heavy lifting, straining or running due to procedural   sedation/anesthesia.  The following day: return to full activity including work.  DIET:  Eat and drink normally unless instructed otherwise.     TREATMENT FOR COMMON SIDE EFFECTS:  - Mild abdominal pain, nausea, belching, bloating or excessive gas:  rest,   eat lightly and use a heating pad.  - Sore Throat: treat with throat lozenges and/or gargle with warm salt   water.  - Because air was used during the procedure, expelling large amounts of air   from your rectum or belching is normal.  - If a bowel prep was taken, you may not have a bowel movement for 1-3 days.    This is normal.  SYMPTOMS TO WATCH FOR AND REPORT TO YOUR PHYSICIAN:  1. Abdominal pain or bloating, other than gas cramps.  2. Chest pain.  3. Back pain.  4. Signs of infection such as: chills or fever occurring within 24 hours   after the procedure.  5. Rectal bleeding, which would show as bright red, maroon, or black stools.   (A tablespoon of blood from the rectum is not serious, especially if    hemorrhoids are present.)  6. Vomiting.  7. Weakness or dizziness.  GO DIRECTLY TO THE NEAREST EMERGENCY ROOM IF YOU HAVE ANY OF THE FOLLOWING:      Difficulty breathing              Chills and/or fever over 101 F   Persistent vomiting and/or vomiting blood   Severe abdominal pain   Severe chest pain   Black, tarry stools   Bleeding- more than one tablespoon   Any other symptom or condition that you feel may need urgent attention  Your doctor recommends these additional instructions:  If any biopsies were taken, your doctors clinic will contact you in 1 to 2   weeks with any results.  - Discharge patient to home (ambulatory).  For questions, problems or results please call your physician - Barbara Zaldivar MD at Work:  [phone].  [facility], EMERGENCY ROOM PHONE NUMBER: [phone]  IF A COMPLICATION OR EMERGENCY SITUATION ARISES AND YOU ARE UNABLE TO REACH   YOUR PHYSICIAN - GO DIRECTLY TO THE EMERGENCY ROOM.  MD Barbara Tarango MD  10/7/2024 12:56:10 PM  This report has been verified and signed electronically.  Dear patient,  As a result of recent federal legislation (The Federal Cures Act), you may   receive lab or pathology results from your procedure in your [facility]   account before your physician is able to contact you. Your physician or   their representative will relay the results to you with their   recommendations at their soonest availability.  Thank you,  [employer]ATION

## 2024-12-12 ENCOUNTER — HOSPITAL ENCOUNTER (EMERGENCY)
Facility: OTHER | Age: 44
Discharge: HOME | End: 2024-12-12
Attending: EMERGENCY MEDICINE
Payer: MEDICAID

## 2024-12-12 VITALS
RESPIRATION RATE: 17 BRPM | SYSTOLIC BLOOD PRESSURE: 120 MMHG | WEIGHT: 130 LBS | HEIGHT: 64 IN | HEART RATE: 95 BPM | OXYGEN SATURATION: 97 % | TEMPERATURE: 98 F | BODY MASS INDEX: 22.2 KG/M2 | DIASTOLIC BLOOD PRESSURE: 74 MMHG

## 2024-12-12 DIAGNOSIS — I82.612 ACUTE EMBOLISM AND THROMBOSIS OF SUPERFICIAL VEIN OF LEFT UPPER EXTREMITY: ICD-10-CM

## 2024-12-12 DIAGNOSIS — D70.9 NEUTROPENIA, UNSPECIFIED TYPE: Primary | ICD-10-CM

## 2024-12-12 PROCEDURE — 99283 EMERGENCY DEPT VISIT LOW MDM: CPT

## 2024-12-12 RX ORDER — CELECOXIB 200 MG/1
200 CAPSULE ORAL 2 TIMES DAILY
Qty: 50 CAPSULE | Refills: 0 | Status: SHIPPED | OUTPATIENT
Start: 2024-12-12

## 2024-12-12 NOTE — ED TRIAGE NOTES
Pt states she was diagnosed at [facility] with a DVT to CHANA yesterday. She was discharged with no meds and given no meds in the ED. She went to see her  this am and was told to go back to the ED for treatment. She first developed swelling  yesterday morning.

## 2024-12-12 NOTE — FIRST PROVIDER EVALUATION
Emergency Department TeleTriage Encounter Note      CHIEF COMPLAINT    Chief Complaint   Patient presents with    Arm Swelling     LUE swelling and pain, seen yesterday for same c/o and Dx'd with LUE DVT. No blood thinner use.        VITAL SIGNS   Initial Vitals [12/12/24 1715]   BP Pulse Resp Temp SpO2   131/77 90 19 98 °F (36.7 °C) 97 %      MAP       --            ALLERGIES    Review of patient's allergies indicates:   Allergen Reactions    Shellfish containing products Swelling     Facial swelling       PROVIDER TRIAGE NOTE  Patient presents with complaint of abnormal ultrasound performed at [facility] yesterday.  Ultrasound showed left basilic vein thrombus.  She states she talked to her primary care doctor who recommended she come to the emergency room to see a cardiologist and hematologist.      Phy:   Constitutional: well nourished, well developed, appearing stated age, NAD        Initial orders will be placed and care will be transferred to an alternate provider when patient is roomed for a full evaluation. Any additional orders and the final disposition will be determined by that provider.        ORDERS  Labs Reviewed - No data to display    ED Orders (720h ago, onward)      None              Virtual Visit Note: The provider triage portion of this emergency department evaluation and documentation was performed via [employer], a HIPAA-compliant telemedicine application, in concert with a tele-presenter in the room. A face to face patient evaluation with one of my colleagues will occur once the patient is placed in an emergency department room.      DISCLAIMER: This note was prepared with [employer] voice recognition transcription software. Garbled syntax, mangled pronouns, and other bizarre constructions may be attributed to that software system.

## 2024-12-13 NOTE — DISCHARGE INSTRUCTIONS
I sent you a prescription for Celebrex.  This is an NSAID similar to ibuprofen however it is known to be easier on the stomach.  May take this up to 2 times a day for the next week for pain relief. Please monitor your symptoms, if you notice blood in stool or are having gastric pains STOP use of this drug.    Please use compression arm sleeve (may purchase at pharmacy or [employer]/online)    Please use warm compresses against clot area. Continue to move your arm to encourage blood flow and clot breakdown.    I have listed some resources for PCPs in the community. Please call to schedule an appmt for follow up. May also go through your insurance to find a PCP.    I have also sent you a referral to Hematology.  Please expect a call from them within 3-5 business days.

## 2024-12-13 NOTE — ED NOTES
44 y.o. female to ED with c.o. left arm swelling and pain since yesterday morning. Patient states she was seen at [facility] yesterday and diagnosed with a DVT in the left arm. Patient states she was discharged without medication. Patient was then seen by her PCP today who advised her to come back to ER for treatment. Patient denies trauma/ injury to the arm, denies fever/chills, denies chest pain/ shortness of breath, denies all other medical complaints at this time.

## 2024-12-13 NOTE — ED PROVIDER NOTES
"Encounter Date: 12/12/2024       History     Chief Complaint   Patient presents with    Arm Swelling     LUE swelling and pain, seen yesterday for same c/o and Dx'd with LUE DVT. No blood thinner use.      45yo female with pmhx alcohol abuse and colon cancer presents for blood clot concerns. Pt states she was seen at [facility] yesterday where she was diagnosed with a superficial venous thrombus in her L basilic vein. Pt states she was sent home with instructions for symptomatic treatment and no blood thinners. Pt states she saw a new PCP today who told her she should return to ED for blood clot treatment. Pt states she noticed the clot when her  touched her arm 3 days ago. Pt denies any pain but states the area on her L forearm feels "sensitive". Pt denies swelling. Pt reports hx colon cancer with surgery performed last summer. Pt denies recent pregnancy, recent periods of immobilization or long periods of travel, denies hormone use, denies hx of DVTs/PE. Pt denies CP, SOB, N/V/D, abdominal pain, hematuria, hematemesis, blood in stool, fever, chills        The history is provided by the patient.     Review of patient's allergies indicates:   Allergen Reactions    Shellfish containing products Swelling     Facial swelling     Past Medical History:   Diagnosis Date    Anemia     Anxiety     GI bleed     Gilbert's syndrome     Hypertension     no meds    Rectal mass      Past Surgical History:   Procedure Laterality Date    AUGMENTATION OF BREAST      COLONOSCOPY N/A 06/28/2023    Procedure: COLONOSCOPY;  Surgeon: Joey Bateman MD;  Location: [facility];  Service: Endoscopy;  Laterality: N/A;    ESOPHAGOGASTRODUODENOSCOPY N/A 06/28/2023    Procedure: EGD (ESOPHAGOGASTRODUODENOSCOPY);  Surgeon: Joey Bateman MD;  Location: [facility];  Service: Endoscopy;  Laterality: N/A;    EXCISIONAL HEMORRHOIDECTOMY N/A 10/11/2023    Procedure: HEMORRHOIDECTOMY;  Surgeon: Barbara Zaldivar MD;  Location: [facility] OR;  " Service: Colon and Rectal;  Laterality: N/A;  , excisional    OVARIAN CYST REMOVAL      ROBOTIC TAMIS (TRANSANAL MINIMALLY INVASIVE SURGERY) N/A 8/29/2023    Procedure: ROBOTIC TAMIS (TRANSANAL MINIMALLY INVASIVE SURGERY);  Surgeon: Barbara Zaldivar MD;  Location: [facility];  Service: Colon and Rectal;  Laterality: N/A;  prone  need open rectal tray / 2 HOURS     Family History   Problem Relation Name Age of Onset    No Known Problems Mother      No Known Problems Father       Social History     Tobacco Use    Smoking status: Never     Passive exposure: Never    Smokeless tobacco: Never   Substance Use Topics    Alcohol use: Not Currently     Comment: 2 bottles of wine daily (when coming off xanax March 2023)    Drug use: Not Currently     Comment: mushrooms rarely     Review of Systems  As per hpi  Physical Exam     Initial Vitals [12/12/24 1715]   BP Pulse Resp Temp SpO2   131/77 90 19 98 °F (36.7 °C) 97 %      MAP       --         Physical Exam    Nursing note and vitals reviewed.  Constitutional: Vital signs are normal. She appears well-developed and well-nourished. She is cooperative.   HENT:   Head: Normocephalic and atraumatic.   Eyes: Conjunctivae and lids are normal.   Neck: Trachea normal. No thyroid mass present.   Cardiovascular:  Normal rate and regular rhythm.           Abdominal: Abdomen is soft.   Musculoskeletal:      Right upper arm: No swelling or tenderness.      Left upper arm: No swelling or tenderness.      Right forearm: No swelling or tenderness.      Left forearm: No swelling or tenderness.     Neurological: She is alert and oriented to person, place, and time. GCS eye subscore is 4. GCS verbal subscore is 5. GCS motor subscore is 6.   Skin: Skin is warm, dry and intact. No rash noted.   Raised 1cm 2-3 bumps in vein near proximal anterior medial L forearm. No swelling or tenderness. No rash.    Psychiatric: She has a normal mood and affect. Her speech is normal and behavior is normal. Thought  "content normal.         ED Course   Procedures  Labs Reviewed - No data to display       Imaging Results    None          Medications - No data to display  Medical Decision Making  Urgent evaluation of a nontoxic afebrile pleasant 45yo female for a superficial venous thrombus in L basilic vein that was diagnosed yesterday at [facility]. Pt without pain or swelling. Pt denies SOB. Pt has full ROM and sensation of bilateral extremities. Good pulses bilaterally. Pt returned to ED today with concern that she did not receive proper treatment for this blood clot after speaking with her PCP. Pt has no other complaints at this time.     Per chart review, pt was discharged home yesterday with instructions for "using warm compresses, compression, elevation, and frequent use of your arm in an attempt to help dissolve the superficial blood clot. You can also take ibuprofen 600 mg 3 times a day as needed for associated pain."  US from yesterday revealed:  There is a thrombus of the left basilic vein at the forearm. Normal compressibility and color flow is seen in the remaining vessels.    DDX includes but not limited to: DVT, superficial venous thrombus, muscle strain, muscle tear, venous insufficiency, Baker's cyst, cellulitis, lymphangitis, compartment syndrome     Patient reassured that she did receive proper treatment for superficial venous thrombus in basilic vein. I thoroughly discussed differences in DVT and SVT and the appropriate treatments for each. Pt verbalized understanding and expressed appreciation for the discussion. Pt also expresses concern for her low wbc ct found in yesterday's labs. Expressed to pt that given her hx of GI bleeds, colon cancer, recent blood clot, that it would be reasonable to see Hematology. Patient also requested information on finding a new PCP as she is new to [facility].   Provided information on treatment plan and follow up in discharge info.   Pt reassured that we have ruled out any life " threatening emergencies and that the pt is safe to be discharged home. Will send home with symptomatic treatment and strict return precautions. Pt advised to follow up with their PCP. Discussed with pt reasons to return to the ED such as fevers>100.4, severe CP, SOB, N/V/D, severe swelling/pain of extremities, vision changes, trouble breathing,         Problems Addressed:  Acute embolism and thrombosis of superficial vein of left upper extremity: acute illness or injury  Neutropenia, unspecified type: chronic illness or injury    Amount and/or Complexity of Data Reviewed  External Data Reviewed: labs and radiology.    Risk  Prescription drug management.                                      Clinical Impression:  Final diagnoses:  [D70.9] Neutropenia, unspecified type (Primary)  [I82.612] Acute embolism and thrombosis of superficial vein of left upper extremity                 Lucille Grimes PA-C  12/12/24 1918

## 2025-01-27 ENCOUNTER — HOSPITAL ENCOUNTER (EMERGENCY)
Facility: OTHER | Age: 45
Discharge: HOME | End: 2025-01-27
Attending: EMERGENCY MEDICINE
Payer: COMMERCIAL

## 2025-01-27 VITALS
SYSTOLIC BLOOD PRESSURE: 157 MMHG | DIASTOLIC BLOOD PRESSURE: 92 MMHG | TEMPERATURE: 98 F | OXYGEN SATURATION: 99 % | RESPIRATION RATE: 18 BRPM | BODY MASS INDEX: 21.66 KG/M2 | HEIGHT: 65 IN | HEART RATE: 84 BPM | WEIGHT: 130 LBS

## 2025-01-27 DIAGNOSIS — R07.9 CHEST PAIN: ICD-10-CM

## 2025-01-27 DIAGNOSIS — R07.89 CHEST WALL PAIN: Primary | ICD-10-CM

## 2025-01-27 DIAGNOSIS — T85.43XA LEAKAGE OF BREAST IMPLANT, INITIAL ENCOUNTER: ICD-10-CM

## 2025-01-27 LAB
ALBUMIN SERPL BCP-MCNC: 4.2 G/DL (ref 3.5–5.2)
ALP SERPL-CCNC: 58 U/L (ref 40–150)
ALT SERPL W/O P-5'-P-CCNC: 15 U/L (ref 10–44)
ANION GAP SERPL CALC-SCNC: 8 MMOL/L (ref 8–16)
AST SERPL-CCNC: 20 U/L (ref 10–40)
B-HCG UR QL: NEGATIVE
BASOPHILS # BLD AUTO: 0.05 K/UL (ref 0–0.2)
BASOPHILS NFR BLD: 0.9 % (ref 0–1.9)
BILIRUB SERPL-MCNC: 1 MG/DL (ref 0.1–1)
BILIRUB UR QL STRIP: NEGATIVE
BUN SERPL-MCNC: 9 MG/DL (ref 6–20)
CALCIUM SERPL-MCNC: 9.6 MG/DL (ref 8.7–10.5)
CHLORIDE SERPL-SCNC: 104 MMOL/L (ref 95–110)
CLARITY UR: CLEAR
CO2 SERPL-SCNC: 27 MMOL/L (ref 23–29)
COLOR UR: COLORLESS
CREAT SERPL-MCNC: 0.6 MG/DL (ref 0.5–1.4)
CTP QC/QA: YES
DIFFERENTIAL METHOD BLD: ABNORMAL
EOSINOPHIL # BLD AUTO: 0 K/UL (ref 0–0.5)
EOSINOPHIL NFR BLD: 0.4 % (ref 0–8)
ERYTHROCYTE [DISTWIDTH] IN BLOOD BY AUTOMATED COUNT: 11.8 % (ref 11.5–14.5)
EST. GFR  (NO RACE VARIABLE): >60 ML/MIN/1.73 M^2
GLUCOSE SERPL-MCNC: 89 MG/DL (ref 70–110)
GLUCOSE UR QL STRIP: NEGATIVE
HCT VFR BLD AUTO: 37.3 % (ref 37–48.5)
HGB BLD-MCNC: 12.8 G/DL (ref 12–16)
HGB UR QL STRIP: NEGATIVE
IMM GRANULOCYTES # BLD AUTO: 0.01 K/UL (ref 0–0.04)
IMM GRANULOCYTES NFR BLD AUTO: 0.2 % (ref 0–0.5)
KETONES UR QL STRIP: NEGATIVE
LEUKOCYTE ESTERASE UR QL STRIP: NEGATIVE
LYMPHOCYTES # BLD AUTO: 1.4 K/UL (ref 1–4.8)
LYMPHOCYTES NFR BLD: 25.2 % (ref 18–48)
MCH RBC QN AUTO: 31.5 PG (ref 27–31)
MCHC RBC AUTO-ENTMCNC: 34.3 G/DL (ref 32–36)
MCV RBC AUTO: 92 FL (ref 82–98)
MONOCYTES # BLD AUTO: 0.4 K/UL (ref 0.3–1)
MONOCYTES NFR BLD: 7.9 % (ref 4–15)
NEUTROPHILS # BLD AUTO: 3.6 K/UL (ref 1.8–7.7)
NEUTROPHILS NFR BLD: 65.4 % (ref 38–73)
NITRITE UR QL STRIP: NEGATIVE
NRBC BLD-RTO: 0 /100 WBC
PH UR STRIP: 7 [PH] (ref 5–8)
PLATELET # BLD AUTO: 300 K/UL (ref 150–450)
PMV BLD AUTO: 9.1 FL (ref 9.2–12.9)
POTASSIUM SERPL-SCNC: 4 MMOL/L (ref 3.5–5.1)
PROT SERPL-MCNC: 7.4 G/DL (ref 6–8.4)
PROT UR QL STRIP: NEGATIVE
RBC # BLD AUTO: 4.06 M/UL (ref 4–5.4)
SODIUM SERPL-SCNC: 139 MMOL/L (ref 136–145)
SP GR UR STRIP: 1.01 (ref 1–1.03)
TROPONIN I SERPL DL<=0.01 NG/ML-MCNC: <0.006 NG/ML (ref 0–0.03)
URN SPEC COLLECT METH UR: ABNORMAL
UROBILINOGEN UR STRIP-ACNC: NEGATIVE EU/DL
WBC # BLD AUTO: 5.44 K/UL (ref 3.9–12.7)

## 2025-01-27 PROCEDURE — 85025 COMPLETE CBC W/AUTO DIFF WBC: CPT | Performed by: NURSE PRACTITIONER

## 2025-01-27 PROCEDURE — 80053 COMPREHEN METABOLIC PANEL: CPT | Performed by: NURSE PRACTITIONER

## 2025-01-27 PROCEDURE — 81003 URINALYSIS AUTO W/O SCOPE: CPT | Performed by: NURSE PRACTITIONER

## 2025-01-27 PROCEDURE — 84484 ASSAY OF TROPONIN QUANT: CPT | Performed by: NURSE PRACTITIONER

## 2025-01-27 PROCEDURE — 25000003 PHARM REV CODE 250: Performed by: EMERGENCY MEDICINE

## 2025-01-27 PROCEDURE — 81025 URINE PREGNANCY TEST: CPT | Performed by: NURSE PRACTITIONER

## 2025-01-27 PROCEDURE — 99285 EMERGENCY DEPT VISIT HI MDM: CPT | Mod: 25

## 2025-01-27 RX ORDER — KETOROLAC TROMETHAMINE 30 MG/ML
10 INJECTION, SOLUTION INTRAMUSCULAR; INTRAVENOUS
Status: DISCONTINUED | OUTPATIENT
Start: 2025-01-27 | End: 2025-01-28 | Stop reason: HOSPADM

## 2025-01-27 RX ORDER — HYDROCODONE BITARTRATE AND ACETAMINOPHEN 5; 325 MG/1; MG/1
1 TABLET ORAL EVERY 6 HOURS PRN
Qty: 12 TABLET | Refills: 0 | Status: SHIPPED | OUTPATIENT
Start: 2025-01-27 | End: 2025-02-18

## 2025-01-27 RX ORDER — HYDROCODONE BITARTRATE AND ACETAMINOPHEN 5; 325 MG/1; MG/1
1 TABLET ORAL
Status: COMPLETED | OUTPATIENT
Start: 2025-01-27 | End: 2025-01-27

## 2025-01-27 RX ADMIN — HYDROCODONE BITARTRATE AND ACETAMINOPHEN 1 TABLET: 5; 325 TABLET ORAL at 11:01

## 2025-01-28 NOTE — FIRST PROVIDER EVALUATION
" Emergency Department TeleTriage Encounter Note      CHIEF COMPLAINT    Chief Complaint   Patient presents with    chest wall pian     Pt states she passed out on 1/17 and coworkers preformed CPR on her and now she has CP. Pt wants to make it clear that "she was not dead" that other coworkers said she just passed out.        VITAL SIGNS   Initial Vitals [01/27/25 1832]   BP Pulse Resp Temp SpO2   (!) 150/112 86 18 98 °F (36.7 °C) 99 %      MAP       --            ALLERGIES    Review of patient's allergies indicates:   Allergen Reactions    Shellfish containing products Swelling     Facial swelling       PROVIDER TRIAGE NOTE  This is a teletriage evaluation of a 44 y.o. female presenting to the ED complaining of chest pain. States that she had a syncopal episode on 1/17 and a coworker did CPR. Pt states, "I was not dead."  Pt here due to CP which she attributes to CPR. No current SOB.  No pmhx of syncopal episodes.    Alert, sitting upright, no distress.     Initial orders will be placed and care will be transferred to an alternate provider when patient is roomed for a full evaluation. Any additional orders and the final disposition will be determined by that provider.         ORDERS  Labs Reviewed   CBC W/ AUTO DIFFERENTIAL   COMPREHENSIVE METABOLIC PANEL   URINALYSIS, REFLEX TO URINE CULTURE   TROPONIN I   POCT URINE PREGNANCY       ED Orders (720h ago, onward)      Start Ordered     Status Ordering Provider    01/27/25 1854 01/27/25 1853  CBC auto differential  STAT         Ordered JARVIS EARLY N.    01/27/25 1854 01/27/25 1853  Comprehensive metabolic panel  STAT         Ordered JARVIS EARLY N.    01/27/25 1854 01/27/25 1853  Insert Saline lock IV  Once         Ordered JARVIS EARLY N.    01/27/25 1854 01/27/25 1853  Urinalysis, Reflex to Urine Culture Urine, Clean Catch  STAT         Ordered JARVIS EARLY N.    01/27/25 1854 01/27/25 1853  Troponin I  STAT         Ordered " JARVIS EARLY N.    01/27/25 1854 01/27/25 1853  POCT urine pregnancy  Once         Ordered JARVIS EARLY N.    01/27/25 1853 01/27/25 1852  X-Ray Chest PA And Lateral  1 time imaging         Ordered JARVIS EARLY N.    01/27/25 1853 01/27/25 1852  EKG 12-lead  Once         Ordered JARVIS EARLY              Virtual Visit Note: The provider triage portion of this emergency department evaluation and documentation was performed via [employer], a HIPAA-compliant telemedicine application, in concert with a tele-presenter in the room. A face to face patient evaluation with one of my colleagues will occur once the patient is placed in an emergency department room.      DISCLAIMER: This note was prepared with [employer] voice recognition transcription software. Garbled syntax, mangled pronouns, and other bizarre constructions may be attributed to that software system.

## 2025-01-28 NOTE — ED PROVIDER NOTES
"Encounter Date: 1/27/2025    SCRIBE #1 NOTE: I, Veronica Shortjolene, am scribing for, and in the presence of,  Andres Mckee MD.       History     Chief Complaint   Patient presents with    chest wall pian     Pt states she passed out on 1/17 and coworkers preformed CPR on her and now she has CP. Pt wants to make it clear that "she was not dead" that other coworkers said she just passed out.      45 y/o F with a PMHx of colon cancer, anxiety, HTN, and Gilbert's syndrome presents to the ED for evaluation of bilateral chest wall pain. The pt states that she became dizzy and lost consciousness at work on 1/17/2025, and her coworker performed CPR which caused her breast implant to rupture. She states that she has silicone implants that were placed in 2009. Of note, pt believes that she may have passed out due to blood clots. She reports associated pain underneath her right breast and she is unable to move her right shoulder secondary to pain. In addition, the pt also reports fatigue since symptoms began. Per chart review, the pt was diagnosed with a superficial venous thrombus in her L basilic vein on 12/11/2024.    The history is provided by the patient. No  was used.     Review of patient's allergies indicates:   Allergen Reactions    Shellfish containing products Swelling     Facial swelling     Past Medical History:   Diagnosis Date    Anemia     Anxiety     GI bleed     Gilbert's syndrome     Hypertension     no meds    Rectal mass      Past Surgical History:   Procedure Laterality Date    AUGMENTATION OF BREAST      COLONOSCOPY N/A 06/28/2023    Procedure: COLONOSCOPY;  Surgeon: Joey Bateman MD;  Location: [facility];  Service: Endoscopy;  Laterality: N/A;    ESOPHAGOGASTRODUODENOSCOPY N/A 06/28/2023    Procedure: EGD (ESOPHAGOGASTRODUODENOSCOPY);  Surgeon: Joey Bateman MD;  Location: [facility];  Service: Endoscopy;  Laterality: N/A;    EXCISIONAL HEMORRHOIDECTOMY N/A " 10/11/2023    Procedure: HEMORRHOIDECTOMY;  Surgeon: Barbara Zaldivar MD;  Location: [facility] OR;  Service: Colon and Rectal;  Laterality: N/A;  , excisional    OVARIAN CYST REMOVAL      ROBOTIC TAMIS (TRANSANAL MINIMALLY INVASIVE SURGERY) N/A 8/29/2023    Procedure: ROBOTIC TAMIS (TRANSANAL MINIMALLY INVASIVE SURGERY);  Surgeon: Barbara Zaldivar MD;  Location: [facility] OR;  Service: Colon and Rectal;  Laterality: N/A;  prone  need open rectal tray / 2 HOURS     Family History   Problem Relation Name Age of Onset    No Known Problems Mother      No Known Problems Father       Social History     Tobacco Use    Smoking status: Never     Passive exposure: Never    Smokeless tobacco: Never   Substance Use Topics    Alcohol use: Not Currently     Comment: 2 bottles of wine daily (when coming off xanax March 2023)    Drug use: Not Currently     Comment: mushrooms rarely     Review of Systems  Constitutional- fatigue, no fever  HEENT-no congestion  Eyes-no redness  Respiratory-no shortness of breath  Cardio-no chest pain  GI-no abdominal pain  Endocrine-no cold intolerance  -no difficulty urinating  MSK- bilateral chest wall pain, right shoulder pain  Skin-no rashes  Allergy-no environmental allergy  Neurologic-, no headache  Hematology-no swollen nodes  Behavioral-no confusion      Physical Exam     Initial Vitals [01/27/25 1832]   BP Pulse Resp Temp SpO2   (!) 150/112 86 18 98 °F (36.7 °C) 99 %      MAP       --         Physical Exam  Constitutional: generally well appearing 45 yo woman in mild distress  Eyes: Conjunctivae normal.  ENT       Head: Normocephalic, atraumatic.       Nose: No congestion.       Mouth/Throat: Mucous membranes are moist.  Hematological/Lymphatic/Immunilogical: No cervical lymphadenopathy.  Cardiovascular: Normal rate, regular rhythm. Normal and symmetric distal pulses.  Respiratory: Normal respiratory effort. Breath sounds are normal.  Gastrointestinal: Soft, nontender.   Musculoskeletal: Normal range of  motion in all extremities. No obvious deformities or swelling.  Positive pain in anterior chest wall along sternum and in the chest wall mid axillary line bilaterally   Neurologic: Alert, oriented. Normal speech and language. No gross focal neurologic deficits are appreciated.  Skin: Skin is warm, dry. No rash noted.  Psychiatric: Mood and affect are normal.       ED Course   Procedures  Labs Reviewed   CBC W/ AUTO DIFFERENTIAL - Abnormal       Result Value    WBC 5.44      RBC 4.06      Hemoglobin 12.8      Hematocrit 37.3      MCV 92      MCH 31.5 (*)     MCHC 34.3      RDW 11.8      Platelets 300      MPV 9.1 (*)     Immature Granulocytes 0.2      Gran # (ANC) 3.6      Immature Grans (Abs) 0.01      Lymph # 1.4      Mono # 0.4      Eos # 0.0      Baso # 0.05      nRBC 0      Gran % 65.4      Lymph % 25.2      Mono % 7.9      Eosinophil % 0.4      Basophil % 0.9      Differential Method Automated     URINALYSIS, REFLEX TO URINE CULTURE - Abnormal    Specimen UA Urine, Clean Catch      Color, UA Colorless (*)     Appearance, UA Clear      pH, UA 7.0      Specific Gravity, UA 1.010      Protein, UA Negative      Glucose, UA Negative      Ketones, UA Negative      Bilirubin (UA) Negative      Occult Blood UA Negative      Nitrite, UA Negative      Urobilinogen, UA Negative      Leukocytes, UA Negative      Narrative:     Specimen Source->Urine   COMPREHENSIVE METABOLIC PANEL    Sodium 139      Potassium 4.0      Chloride 104      CO2 27      Glucose 89      BUN 9      Creatinine 0.6      Calcium 9.6      Total Protein 7.4      Albumin 4.2      Total Bilirubin 1.0      Alkaline Phosphatase 58      AST 20      ALT 15      eGFR >60      Anion Gap 8     TROPONIN I    Troponin I <0.006     POCT URINE PREGNANCY    POC Preg Test, Ur Negative       Acceptable Yes          ECG Results              EKG 12-lead (Preliminary result)  Result time 01/27/25 19:59:32      Wet Read by Andres Mckee MD (01/27/25  19:59:32, [facility] Emergency Dept, Emergency Medicine)    My EKG interpretation, sinus rhythm, 92 beats per minute, normal axis, no ST segment changes, when compared to previous EKG 07/04/2024 relatively unchanged                                  Imaging Results              CT Chest Without Contrast (Edited Result - FINAL)  Result time 01/27/25 22:50:45      Addendum (preliminary) 1 of 1 by Isabel Perea MD (01/27/25 22:50:45)      Bilateral breast implant intracapsular ruptures are seen.  This was partially visualized on July 2024 abdominal CT.  Continued mammographic follow-up and screening are recommended.      Electronically signed by: Isabel Perea MD  Date:    01/27/2025  Time:    22:50                 Final result by Isabel Perea MD (01/27/25 22:35:02)                   Impression:      No acute intrathoracic abnormalities identified.      Electronically signed by: Isabel Perea MD  Date:    01/27/2025  Time:    22:35               Narrative:    EXAMINATION:  CT CHEST WITHOUT CONTRAST    CLINICAL HISTORY:  Abnormal xray - lung nodule, < 1 cm, low risk;    TECHNIQUE:  Low dose axial images, sagittal and coronal reformations were obtained from the thoracic inlet to the lung bases. Contrast was not administered.    COMPARISON:  None.    FINDINGS:  Structures at the base of the neck are unremarkable.  Aorta is non-aneurysmal.  The heart is normal in size without pericardial effusion.  The esophagus is unremarkable along its course.    The trachea and bronchi are patent.  The lungs are symmetrically expanded.  Lungs are clear.  No evidence of consolidation, mass, or pleural effusion.    The visualized abdominal structures are unremarkable.  No acute osseous abnormality identified.  Bilateral breast implants are seen.  Extrathoracic soft tissues are unremarkable.                                       US Lower Extremity Veins Bilateral (Final result)  Result time 01/27/25 22:01:43      Final result by  Isabel Perea MD (01/27/25 22:01:43)                   Impression:      No evidence of lower extremity deep venous thrombosis.      Electronically signed by: Isabel Perea MD  Date:    01/27/2025  Time:    22:01               Narrative:    EXAMINATION:  US LOWER EXTREMITY VEINS BILATERAL    CLINICAL HISTORY:  Acute embolism and thrombosis of unspecified deep veins of unspecified lower extremity    TECHNIQUE:  Duplex and color flow Doppler evaluation of the bilateral lower extremity veins was performed.    COMPARISON:  None    FINDINGS:  No evidence of clot involving the bilateral common femoral, greater saphenous, femoral, popliteal, peroneal, anterior and posterior tibial veins.  All venous structures demonstrate normal respiratory phasicity and augment adequately.  No evidence of soft tissue mass or Baker's cyst.                                       X-Ray Chest PA And Lateral (Final result)  Result time 01/27/25 19:07:26      Final result by Isabel Perea MD (01/27/25 19:07:26)                   Impression:      No acute cardiopulmonary process identified.      Electronically signed by: Isabel Perea MD  Date:    01/27/2025  Time:    19:07               Narrative:    EXAMINATION:  XR CHEST PA AND LATERAL    CLINICAL HISTORY:  Chest pain, unspecified    TECHNIQUE:  PA and lateral views of the chest were performed.    COMPARISON:  July 2024.    FINDINGS:  Cardiac silhouette is normal in size.  Lungs are symmetrically expanded.  No evidence of focal consolidative process, pneumothorax, or significant pleural effusion.  No acute osseous abnormality identified.  Dextroscoliotic curvature seen of the thoracic spine.                                    X-Rays:   Independently Interpreted Readings:   Other Readings:  Cxr- no pneumothorax or overt osseous injury    Medications   HYDROcodone-acetaminophen 5-325 mg per tablet 1 tablet (1 tablet Oral Given 1/27/25 5998)     Medical Decision Making  Ddx- msk  strain, sternal fx, pneumothorax, chest wall pain, rib fx, breast implant sickness      Problems Addressed:  Chest pain: acute illness or injury  Chest wall pain: acute illness or injury  Leakage of breast implant, initial encounter: chronic illness or injury    Amount and/or Complexity of Data Reviewed  External Data Reviewed: notes.     Details: See HPI for details.  Labs: ordered. Decision-making details documented in ED Course.  Radiology: ordered and independent interpretation performed. Decision-making details documented in ED Course.  ECG/medicine tests: ordered and independent interpretation performed. Decision-making details documented in ED Course.    Risk  OTC drugs.  Prescription drug management.  Parenteral controlled substances.            Scribe Attestation:   Scribe #1: I performed the above scribed service and the documentation accurately describes the services I performed. I attest to the accuracy of the note.                           Physician Attestation for Scribe: I, andres mckee, reviewed documentation as scribed in my presence, which is both accurate and complete.      Clinical Impression:  Final diagnoses:  [R07.9] Chest pain  [R07.89] Chest wall pain (Primary)  [T85.43XA] Leakage of breast implant, initial encounter          ED Disposition Condition    Discharge Stable          ED Prescriptions       Medication Sig Dispense Start Date End Date Auth. Provider    HYDROcodone-acetaminophen (NORCO) 5-325 mg per tablet Take 1 tablet by mouth every 6 (six) hours as needed for Pain. 12 tablet 1/27/2025 -- Andres Mckee MD          Follow-up Information       Follow up With Specialties Details Why Contact Info    [facility] PLASTIC SURGERY Plastic Surgery Schedule an appointment as soon as possible for a visit  As needed, For a follow up visit about today [address] [place] [id number]  [phone]             Andres Mckee MD  02/01/25 6978

## 2025-01-28 NOTE — DISCHARGE INSTRUCTIONS
Mrs. Churchill,    Thank you for letting me care for you today! It was nice meeting you, and I hope you feel better soon.   If you would like access to your chart and what was done today please utilize the [facility].   Please come back to [facility] for all of your future medical needs.    Our goal in the emergency department is to always give you outstanding care and exceptional service. You may receive a survey by mail or e-mail in the next week regarding your experience in our ED. We would greatly appreciate you completing and returning the survey. Your feedback provides us with a way to recognize our staff who give very good care and it helps us learn how to improve when your experience was below our aspiration of excellence.     Sincerely,    Andres Mckee MD  Board Certified Emergency Physician

## 2025-02-05 ENCOUNTER — OFFICE VISIT (OUTPATIENT)
Dept: PLASTIC SURGERY | Facility: CLINIC | Age: 45
End: 2025-02-05
Payer: MEDICAID

## 2025-02-05 VITALS
DIASTOLIC BLOOD PRESSURE: 61 MMHG | WEIGHT: 130.06 LBS | SYSTOLIC BLOOD PRESSURE: 123 MMHG | BODY MASS INDEX: 21.67 KG/M2 | HEIGHT: 65 IN

## 2025-02-05 DIAGNOSIS — T85.43XA RUPTURE OF IMPLANT OF RIGHT BREAST, INITIAL ENCOUNTER: Primary | ICD-10-CM

## 2025-02-05 PROCEDURE — 3074F SYST BP LT 130 MM HG: CPT | Mod: CPTII,S$GLB,, | Performed by: SURGERY

## 2025-02-05 PROCEDURE — 99203 OFFICE O/P NEW LOW 30 MIN: CPT | Mod: S$GLB,,, | Performed by: SURGERY

## 2025-02-05 PROCEDURE — 3008F BODY MASS INDEX DOCD: CPT | Mod: CPTII,S$GLB,, | Performed by: SURGERY

## 2025-02-05 PROCEDURE — 3078F DIAST BP <80 MM HG: CPT | Mod: CPTII,S$GLB,, | Performed by: SURGERY

## 2025-02-05 PROCEDURE — 99999 PR PBB SHADOW E&M-EST. PATIENT-LVL III: CPT | Mod: PBBFAC,,, | Performed by: SURGERY

## 2025-02-05 PROCEDURE — 99213 OFFICE O/P EST LOW 20 MIN: CPT | Mod: PBBFAC | Performed by: SURGERY

## 2025-02-05 PROCEDURE — 1159F MED LIST DOCD IN RCRD: CPT | Mod: CPTII,S$GLB,, | Performed by: SURGERY
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
## 2025-02-13 ENCOUNTER — TELEPHONE (OUTPATIENT)
Dept: PRIMARY CARE CLINIC | Facility: CLINIC | Age: 45
End: 2025-02-13
Payer: COMMERCIAL

## 2025-02-13 DIAGNOSIS — Z01.818 PRE-OP EVALUATION: Primary | ICD-10-CM

## 2025-02-13 NOTE — TELEPHONE ENCOUNTER
----- Message from Sonia sent at 2/13/2025  3:46 PM CST -----  Who called:self      What is the request in detail:pt would like for you to give her a call and tell her where is her appt for today      Can the clinic reply by [facility]?     Would the patient rather a call back or a response via [facility]?callback      Best call back number:[phone]     Additional Information:

## 2025-02-13 NOTE — TELEPHONE ENCOUNTER
----- Message from Maritza sent at 2/13/2025  4:14 PM CST -----  Contact: [phone]  .[email or web address]     Patient is calling for Medical Advice regarding:Pt is calling in regards to labs that were scheduled , pt sates when she arrived to the lab she was told by  that only order she saw was EKG    Pt would like to know if she could get labs done Saturday when coming to appt , pt surgery date 3/7/2025    How long has patient had these symptoms:    Pharmacy name and phone#:    Patient wants a call back or thru [facility]:Call back      Please call pt and advise

## 2025-02-13 NOTE — TELEPHONE ENCOUNTER
----- Message from Jennifer sent at 2/13/2025  3:27 PM CST -----  Regarding: Return Call    Who Called:  Patient      Who Left Message for Patient:  Florentin NIEVES,      Does the patient know what this is regarding?  Returning Call        Best Call Back Number: [phone]         Additional Information: Patient is returning a call.  Please assist. Call patient about labs

## 2025-02-13 NOTE — TELEPHONE ENCOUNTER
Spoke to Dr Saavedra advised her of patient's message regarding orders for  pre-op clearance. Dr Saavedra states she will order labs for patient. Patient states she is currently in the area of the lab & she lives far.

## 2025-02-13 NOTE — TELEPHONE ENCOUNTER
----- Message from Maritza sent at 2/13/2025 10:56 AM CST -----  Contact: Contact Information [phone] .1MEDICALADVICE     Patient is calling for Medical Advice regarding:Pt is calling to inform provider on what labs she would need when coming for surgery clearance. The list is below    History/physical  CBC  PT/PTT/INR  Complete metabolic  Qualitative HCG  EKG W interpretation    Pt surgery date is 3/7/2025     Patient wants a call back or thru [facility]:Call back     Please call pt and advise

## 2025-02-14 ENCOUNTER — LAB VISIT (OUTPATIENT)
Dept: PRIMARY CARE CLINIC | Facility: CLINIC | Age: 45
End: 2025-02-14
Payer: COMMERCIAL

## 2025-02-14 DIAGNOSIS — Z01.818 PRE-OP EVALUATION: ICD-10-CM

## 2025-02-14 PROCEDURE — 85610 PROTHROMBIN TIME: CPT | Performed by: STUDENT IN AN ORGANIZED HEALTH CARE EDUCATION/TRAINING PROGRAM

## 2025-02-14 PROCEDURE — 85025 COMPLETE CBC W/AUTO DIFF WBC: CPT | Performed by: STUDENT IN AN ORGANIZED HEALTH CARE EDUCATION/TRAINING PROGRAM

## 2025-02-14 PROCEDURE — 85730 THROMBOPLASTIN TIME PARTIAL: CPT | Performed by: STUDENT IN AN ORGANIZED HEALTH CARE EDUCATION/TRAINING PROGRAM

## 2025-02-14 PROCEDURE — 80053 COMPREHEN METABOLIC PANEL: CPT | Performed by: STUDENT IN AN ORGANIZED HEALTH CARE EDUCATION/TRAINING PROGRAM

## 2025-02-14 PROCEDURE — 84702 CHORIONIC GONADOTROPIN TEST: CPT | Performed by: STUDENT IN AN ORGANIZED HEALTH CARE EDUCATION/TRAINING PROGRAM

## 2025-02-15 ENCOUNTER — TELEPHONE (OUTPATIENT)
Dept: PRIMARY CARE CLINIC | Facility: CLINIC | Age: 45
End: 2025-02-15
Payer: MEDICAID

## 2025-02-15 LAB
ALBUMIN SERPL BCP-MCNC: 4.2 G/DL (ref 3.5–5.2)
ALP SERPL-CCNC: 55 U/L (ref 40–150)
ALT SERPL W/O P-5'-P-CCNC: 16 U/L (ref 10–44)
ANION GAP SERPL CALC-SCNC: 10 MMOL/L (ref 8–16)
APTT PPP: 25.6 SEC (ref 21–32)
AST SERPL-CCNC: 47 U/L (ref 10–40)
BASOPHILS # BLD AUTO: 0.04 K/UL (ref 0–0.2)
BASOPHILS NFR BLD: 0.6 % (ref 0–1.9)
BILIRUB SERPL-MCNC: 1.4 MG/DL (ref 0.1–1)
BUN SERPL-MCNC: 13 MG/DL (ref 6–20)
CALCIUM SERPL-MCNC: 9.5 MG/DL (ref 8.7–10.5)
CHLORIDE SERPL-SCNC: 109 MMOL/L (ref 95–110)
CO2 SERPL-SCNC: 19 MMOL/L (ref 23–29)
CREAT SERPL-MCNC: 0.7 MG/DL (ref 0.5–1.4)
DIFFERENTIAL METHOD BLD: ABNORMAL
EOSINOPHIL # BLD AUTO: 0.1 K/UL (ref 0–0.5)
EOSINOPHIL NFR BLD: 1 % (ref 0–8)
ERYTHROCYTE [DISTWIDTH] IN BLOOD BY AUTOMATED COUNT: 12.3 % (ref 11.5–14.5)
EST. GFR  (NO RACE VARIABLE): >60 ML/MIN/1.73 M^2
GLUCOSE SERPL-MCNC: 90 MG/DL (ref 70–110)
HCG INTACT+B SERPL-ACNC: <2.4 MIU/ML
HCT VFR BLD AUTO: 41.9 % (ref 37–48.5)
HGB BLD-MCNC: 13 G/DL (ref 12–16)
IMM GRANULOCYTES # BLD AUTO: 0.01 K/UL (ref 0–0.04)
IMM GRANULOCYTES NFR BLD AUTO: 0.1 % (ref 0–0.5)
INR PPP: 1 (ref 0.8–1.2)
LYMPHOCYTES # BLD AUTO: 1.6 K/UL (ref 1–4.8)
LYMPHOCYTES NFR BLD: 22 % (ref 18–48)
MCH RBC QN AUTO: 30.2 PG (ref 27–31)
MCHC RBC AUTO-ENTMCNC: 31 G/DL (ref 32–36)
MCV RBC AUTO: 97 FL (ref 82–98)
MONOCYTES # BLD AUTO: 0.6 K/UL (ref 0.3–1)
MONOCYTES NFR BLD: 8.8 % (ref 4–15)
NEUTROPHILS # BLD AUTO: 4.8 K/UL (ref 1.8–7.7)
NEUTROPHILS NFR BLD: 67.5 % (ref 38–73)
NRBC BLD-RTO: 0 /100 WBC
PLATELET # BLD AUTO: 347 K/UL (ref 150–450)
PMV BLD AUTO: 9.6 FL (ref 9.2–12.9)
POTASSIUM SERPL-SCNC: 4 MMOL/L (ref 3.5–5.1)
PROT SERPL-MCNC: 7 G/DL (ref 6–8.4)
PROTHROMBIN TIME: 11.3 SEC (ref 9–12.5)
RBC # BLD AUTO: 4.3 M/UL (ref 4–5.4)
SODIUM SERPL-SCNC: 138 MMOL/L (ref 136–145)
WBC # BLD AUTO: 7.08 K/UL (ref 3.9–12.7)

## 2025-02-15 NOTE — TELEPHONE ENCOUNTER
----- Message from Aminta sent at 2/15/2025  8:36 AM CST -----  Contact: [phone]  Pt is scheduled for a pre op this morning but her car is over heating and she is requesting it be moved to Monday. Can you call her to discuss if this would be possible or is she needs to try and find a ride from someone for this morning Please call pt urgently.

## 2025-02-17 ENCOUNTER — RESULTS FOLLOW-UP (OUTPATIENT)
Dept: PRIMARY CARE CLINIC | Facility: CLINIC | Age: 45
End: 2025-02-17

## 2025-02-18 ENCOUNTER — OFFICE VISIT (OUTPATIENT)
Dept: PRIMARY CARE CLINIC | Facility: CLINIC | Age: 45
End: 2025-02-18
Payer: COMMERCIAL

## 2025-02-18 ENCOUNTER — PATIENT MESSAGE (OUTPATIENT)
Dept: PRIMARY CARE CLINIC | Facility: CLINIC | Age: 45
End: 2025-02-18

## 2025-02-18 VITALS
HEIGHT: 64 IN | BODY MASS INDEX: 23.88 KG/M2 | SYSTOLIC BLOOD PRESSURE: 126 MMHG | OXYGEN SATURATION: 100 % | DIASTOLIC BLOOD PRESSURE: 82 MMHG | HEART RATE: 82 BPM | WEIGHT: 139.88 LBS

## 2025-02-18 DIAGNOSIS — Z01.818 PRE-OP EVALUATION: Primary | ICD-10-CM

## 2025-02-18 NOTE — PROGRESS NOTES
Clinic Note  2/18/2025      Subjective:       Patient ID:  Shruthi is a 44 y.o. female being seen for:      History of Present Illness    CHIEF COMPLAINT:  Shruthi presents today for pre-operative evaluation for ruptured breast implant removal    HISTORY OF PRESENT ILLNESS:  She reports experiencing hair loss, which she attributes to complications from ruptured breast implants. This symptom led her to believe something was wrong with her health. Her doctor has recommended immediate removal of the implants due to their impact on her overall well-being.    MEDICAL HISTORY:  She has Gilbert Syndrome which affects her liver functions, particularly when consuming meats or certain foods. She denies any history of kidney disease, diabetes, stroke, or heart attacks.    SURGICAL HISTORY:  She has a history of cosmetic rhinoplasty.    DIET:  She attempts to maintain a vegan diet with emphasis on vegetable intake to manage her liver functions, though reports difficulty with adherence.    MEDICATIONS AND SUPPLEMENTS:  She has been taking Blood Builder supplement for approximately one month.    DIAGNOSTIC STUDIES:  Blood work was completed two days ago. Mammogram was completed last year, and breast ultrasound was performed during recent emergency room visit.      ROS:  General: -fever, -chills, -fatigue, -weight gain, -weight loss, -change in weight  Eyes: -vision changes, -redness, -discharge  ENT: -ear pain, -nasal congestion, -sore throat  Cardiovascular: -chest pain, -palpitations, -lower extremity edema  Respiratory: -cough, -shortness of breath  Gastrointestinal: -abdominal pain, -nausea, -vomiting, -diarrhea, -constipation, -blood in stool  Genitourinary: -dysuria, -hematuria, -frequency  Musculoskeletal: -joint pain, -muscle pain  Skin: -rash, -lesion  Neurological: -headache, -dizziness, -numbness, -tingling  Psychiatric: -anxiety, -depression, -sleep difficulty           Medication List with Changes/Refills  "  Discontinued Medications    CELECOXIB (CELEBREX) 200 MG CAPSULE    Take 1 capsule (200 mg total) by mouth 2 (two) times daily.    CHLORDIAZEPOXIDE (LIBRIUM) 25 MG CAP    Take 2 capsules (50 mg total) by mouth every 8 (eight) hours for 1 day, THEN 1 capsule (25 mg total) every 6 (six) hours for 1 day, THEN 1 capsule (25 mg total) every 8 (eight) hours for 1 day, THEN 1 capsule (25 mg total) every 12 (twelve) hours for 1 day, THEN 1 capsule (25 mg total) once daily for 1 day, THEN 1 capsule (25 mg total) every 24 hours as needed (withdrawal symptoms).    FOLIC ACID (FOLVITE) 1 MG TABLET    Take 1 tablet (1 mg total) by mouth once daily.    HYDROCODONE-ACETAMINOPHEN (NORCO) 5-325 MG PER TABLET    Take 1 tablet by mouth every 6 (six) hours as needed for Pain.    LINACLOTIDE (LINZESS) 72 MCG CAP CAPSULE    Take 1 capsule (72 mcg total) by mouth before breakfast.    THIAMINE 100 MG TABLET    Take 1 tablet (100 mg total) by mouth once daily.    TRAZODONE (DESYREL) 50 MG TABLET    Take 50 mg by mouth every evening.       Problem List[1]        Objective:      /82 (BP Location: Left arm, Patient Position: Sitting)   Pulse 82   Ht 5' 4" (1.626 m)   Wt 63.5 kg (139 lb 14.1 oz)   LMP 01/17/2025   SpO2 100%   BMI 24.01 kg/m²       Physical Exam    General: No acute distress. Well-developed. Well-nourished.  Eyes: EOMI. Sclerae anicteric.  HENT: Normocephalic. Atraumatic. Nares patent. Moist oral mucosa.  Cardiovascular: Regular rate. Regular rhythm. No murmurs. No rubs. No gallops. Normal S1, S2.  Respiratory: Normal respiratory effort. Clear to auscultation bilaterally. No rales. No rhonchi. No wheezing.  Musculoskeletal: No  obvious deformity.  Extremities: No lower extremity edema.  Neurological: Alert & oriented x3. No slurred speech. Normal gait.  Psychiatric: Normal mood. Normal affect. Good insight. Good judgment.  Skin: Warm. Dry. No rash.           Assessment and Plan:       - Reviewed labs, noting normal " results except for slightly elevated liver functions, potentially related to Gilbert's Syndrome  - Assessed patient's readiness for upcoming breast implant removal surgery, considering recent labs and need for EKG  - Performed physical exam, finding no concerning issues    RUPTURED BREAST IMPLANTS:  - Evaluated the patient's condition, noting two ruptured breast implants causing illness.  - Reviewed previous diagnostic tests, including a mammogram from last year and an ultrasound performed in the emergency room.  - Acknowledged the upcoming breast surgery scheduled for March 7th in [place].  - Preparing to fax all necessary test results, including labs, mammogram, and EKG, to the surgeon's office.  - Noted patient's report of hair loss as a symptom related to breast implant illness.  - Evaluated the patient's condition, confirming two ruptured breast implants.  - Reviewed previous diagnostic tests, including a mammogram from last year and an ultrasound performed in the emergency room.  - Acknowledged the upcoming breast implant removal surgery scheduled for March 7th in [place].  - Preparing to fax all necessary test results, including labs, mammogram, and EKG, to the surgeon's office.  - EKG today, reviewed, normal sinus rhythm  - RCRI score 0, pt has 3.9% risk of cardiovascular complications within 30 days of surgery, medically optimized for breast surgery    ELEVATED LIVER ENZYMES:  - Explained that slightly elevated liver functions may be attributed to Gilbert's Syndrome and are not expected to interfere with the scheduled surgery.  - Recommend repeating liver function tests in the future for ongoing monitoring.  - Noted fluctuating liver enzyme levels with current slight elevation.  - Explained that the elevated liver functions are not expected to prevent the scheduled surgery.  - Recommend repeating liver function tests in the future for ongoing monitoring.  - Noted patient's report of food allergies affecting  liver enzyme levels.  - Advised the patient to continue managing food allergies through dietary modifications.    LOW WHITE BLOOD CELL COUNT:  - Discussed potential connection between low white blood cell count and silicone implants, noting current normalization.    MEDICATIONS/SUPPLEMENTS:  - Continued Blood Builder supplement.    FOLLOW UP:  - Follow up to fax labs results, mammogram, and EKG to [place] doctor's office ([phone]) for upcoming March 7th surgery.  - Contact the office if assistance is needed to send documents through [facility].   Teresa ROCHA  [address] [place] [address] [phone]  Fax: [phone]        Follow Up:   No follow-ups on file.    Other Orders Placed This Visit:  Orders Placed This Encounter    EKG 12-lead         [place] MD        This note is dictated on [employer] word recognition program and This note was generated with the assistance of ambient listening technology. Verbal consent was obtained by the patient and accompanying visitor(s) for the recording of patient appointment to facilitate this note. I attest to having reviewed and edited the generated note for accuracy, though some syntax or spelling errors may persist. Please contact the author of this note for any clarification.  .  There are word recognition mistakes that are occasionally missed on review.         [1]   Patient Active Problem List  Diagnosis    Alcohol abuse    Tumor of rectum    Grade IV hemorrhoids    Alcohol withdrawal seizure    Gilbert's syndrome

## 2025-02-19 LAB
OHS QRS DURATION: 72 MS
OHS QTC CALCULATION: 444 MS

## 2025-02-21 ENCOUNTER — TELEPHONE (OUTPATIENT)
Dept: PRIMARY CARE CLINIC | Facility: CLINIC | Age: 45
End: 2025-02-21
Payer: MEDICAID

## 2025-02-21 NOTE — TELEPHONE ENCOUNTER
----- Message from Terrance sent at 2/20/2025 10:49 PM CST -----  Contact: Carmina/Dr Harper [phone]    ----- Message -----  From: Aminta Urena  Sent: 2/20/2025   4:00 PM CST  To: Quentin Madison Staff    Requesting letter clearing pt for having breast sx on 3/7/2025 and stating she is low risk. Please fax to [phone]. They received letter from provider but it did not state anything about the pt being clear for sx.

## 2025-02-28 ENCOUNTER — TELEPHONE (OUTPATIENT)
Dept: SURGERY | Facility: CLINIC | Age: 45
End: 2025-02-28
Payer: MEDICAID

## 2025-02-28 NOTE — TELEPHONE ENCOUNTER
----- Message from WIL Goldman sent at 2/27/2025  4:24 PM CST -----  Regarding: FW: Refill  Contact: [phone]    ----- Message -----  From: Valerio Vences  Sent: 2/27/2025  12:39 PM CST  To: Kayley Jimenez Staff  Subject: Refill                                           Rx Refill/Request Is this a Refill or New Rx:  RefillRx Name and Strength:  Pt does not no the name of the Stool softenerPreferred Pharmacy with phone number:[id number] - [place], [place] - [address] [place] [address]Nvsvr: [phone] Fax: [phone] Communication Preference:[phone] Information:  Thank you,

## (undated) DEVICE — SEAL UNIVERSAL 5MM-8MM XI

## (undated) DEVICE — LOOP VESSEL BLUE MAXI

## (undated) DEVICE — GLOVE SENSICARE PI SURG 6

## (undated) DEVICE — SET TRI-LUMEN FILTERED TUBE

## (undated) DEVICE — ELECTRODE REM PLYHSV RETURN 9

## (undated) DEVICE — BANDAGE GAUZE 6PLY FLUFF 2X3

## (undated) DEVICE — SUT VICRYL PLUS 0 CT1 36IN

## (undated) DEVICE — COVER TIP CURVED SCISSORS XI

## (undated) DEVICE — RELOAD SUREFORM 45 3.5 BLU 6R

## (undated) DEVICE — DRAPE ARM DAVINCI XI

## (undated) DEVICE — RELOAD SUREFORM 60 3.5 BLU 6R

## (undated) DEVICE — GLOVE SENSICARE PI GRN 6.5

## (undated) DEVICE — TIP YANKAUERS BULB NO VENT

## (undated) DEVICE — DRAPE LAPSCP CHOLE 122X102X78

## (undated) DEVICE — TOWEL OR DISP STRL BLUE 4/PK

## (undated) DEVICE — KIT GELPOINT TRNSANL ACC 5.5CM

## (undated) DEVICE — TUBING SUC UNIV W/CONN 12FT

## (undated) DEVICE — SEALER VESSEL EXTEND

## (undated) DEVICE — SOL NORMAL USPCA 0.9%

## (undated) DEVICE — STAPLER SUREFORM SGL USE 45

## (undated) DEVICE — Device

## (undated) DEVICE — BRIEF MESH LARGE

## (undated) DEVICE — SYR 50ML CATH TIP

## (undated) DEVICE — DRAPE UND BUTT W/POUCH 40X44IN

## (undated) DEVICE — OBTURATOR BLADELESS 8MM XI

## (undated) DEVICE — SOL POVIDONE PREP IODINE 4 OZ

## (undated) DEVICE — PAD PREP CUFFED NS 24X48IN

## (undated) DEVICE — DRAPE COLUMN DAVINCI XI

## (undated) DEVICE — SYR IRRIGATION BULB STER 60ML

## (undated) DEVICE — CANNULA SEAL 12MM

## (undated) DEVICE — CANNULA REDUCER 12-8MM

## (undated) DEVICE — LUBRICANT SURGILUBE 2 OZ

## (undated) DEVICE — GRASPER EPIX 5X20MM 45CM

## (undated) DEVICE — ADHESIVE DERMABOND ADVANCED

## (undated) DEVICE — SPONGE COTTON TRAY 4X4IN

## (undated) DEVICE — CONNECTOR TUBING STR 5 IN 1

## (undated) DEVICE — DRAPE THREE-QTR REINF 53X77IN

## (undated) DEVICE — RELOAD SUREFORM 45 4.3 GRN 6R

## (undated) DEVICE — DRAPE LEGGINGS CUFF 33X51IN

## (undated) DEVICE — SUT 0 8-27IN VICRYL PL CT-1

## (undated) DEVICE — PACK ROBOTIC

## (undated) DEVICE — SOL ELECTROLUBE ANTI-STIC

## (undated) DEVICE — CATH ALL PURPOSE URETHRAL 14FR

## (undated) DEVICE — SUT 3-0 VICRYL SH CR/8 18

## (undated) DEVICE — SPONGE BULKEE II ABSRB 6X6.75

## (undated) DEVICE — YANKAUER OPEN TIP W/O VENT

## (undated) DEVICE — SUT MCRYL PLUS 4-0 PS2 27IN

## (undated) DEVICE — SYS SEE SHARP SCP ANTIFG LNG

## (undated) DEVICE — SUT V-LOC 180 ABD 2/0 GS-21

## (undated) DEVICE — TRAY SKIN SCRUB WET PREMIUM

## (undated) DEVICE — JELLY SURGILUBE LUBE TUBE 2OZ